# Patient Record
Sex: FEMALE | Race: ASIAN | Employment: FULL TIME | URBAN - METROPOLITAN AREA
[De-identification: names, ages, dates, MRNs, and addresses within clinical notes are randomized per-mention and may not be internally consistent; named-entity substitution may affect disease eponyms.]

---

## 2019-03-07 ENCOUNTER — APPOINTMENT (OUTPATIENT)
Dept: CT IMAGING | Facility: CLINIC | Age: 54
End: 2019-03-07
Attending: EMERGENCY MEDICINE
Payer: COMMERCIAL

## 2019-03-07 ENCOUNTER — HOSPITAL ENCOUNTER (EMERGENCY)
Facility: CLINIC | Age: 54
Discharge: HOME | End: 2019-03-07
Attending: EMERGENCY MEDICINE | Admitting: EMERGENCY MEDICINE
Payer: COMMERCIAL

## 2019-03-07 VITALS
TEMPERATURE: 98.8 F | RESPIRATION RATE: 18 BRPM | HEART RATE: 81 BPM | OXYGEN SATURATION: 99 % | DIASTOLIC BLOOD PRESSURE: 98 MMHG | SYSTOLIC BLOOD PRESSURE: 162 MMHG

## 2019-03-07 DIAGNOSIS — K57.92 ACUTE DIVERTICULITIS: ICD-10-CM

## 2019-03-07 LAB
ALBUMIN SERPL-MCNC: 3.5 G/DL (ref 3.4–5)
ALBUMIN UR-MCNC: NEGATIVE MG/DL
ALP SERPL-CCNC: 101 U/L (ref 40–150)
ALT SERPL W P-5'-P-CCNC: 47 U/L (ref 0–50)
ANION GAP SERPL CALCULATED.3IONS-SCNC: 6 MMOL/L (ref 3–14)
APPEARANCE UR: CLEAR
AST SERPL W P-5'-P-CCNC: 23 U/L (ref 0–45)
BASOPHILS # BLD AUTO: 0 10E9/L (ref 0–0.2)
BASOPHILS NFR BLD AUTO: 0.2 %
BILIRUB SERPL-MCNC: 0.5 MG/DL (ref 0.2–1.3)
BILIRUB UR QL STRIP: NEGATIVE
BUN SERPL-MCNC: 14 MG/DL (ref 7–30)
CALCIUM SERPL-MCNC: 8.7 MG/DL (ref 8.5–10.1)
CHLORIDE SERPL-SCNC: 104 MMOL/L (ref 94–109)
CO2 SERPL-SCNC: 26 MMOL/L (ref 20–32)
COLOR UR AUTO: COLORLESS
CREAT BLD-MCNC: 0.7 MG/DL (ref 0.52–1.04)
CREAT SERPL-MCNC: 0.66 MG/DL (ref 0.52–1.04)
DIFFERENTIAL METHOD BLD: ABNORMAL
EOSINOPHIL # BLD AUTO: 0.1 10E9/L (ref 0–0.7)
EOSINOPHIL NFR BLD AUTO: 0.7 %
ERYTHROCYTE [DISTWIDTH] IN BLOOD BY AUTOMATED COUNT: 13.7 % (ref 10–15)
GFR SERPL CREATININE-BSD FRML MDRD: 88 ML/MIN/{1.73_M2}
GFR SERPL CREATININE-BSD FRML MDRD: >90 ML/MIN/{1.73_M2}
GLUCOSE SERPL-MCNC: 127 MG/DL (ref 70–99)
GLUCOSE UR STRIP-MCNC: NEGATIVE MG/DL
HCT VFR BLD AUTO: 39.5 % (ref 35–47)
HGB BLD-MCNC: 12.7 G/DL (ref 11.7–15.7)
HGB UR QL STRIP: NEGATIVE
IMM GRANULOCYTES # BLD: 0.1 10E9/L (ref 0–0.4)
IMM GRANULOCYTES NFR BLD: 0.7 %
KETONES UR STRIP-MCNC: NEGATIVE MG/DL
LEUKOCYTE ESTERASE UR QL STRIP: NEGATIVE
LIPASE SERPL-CCNC: 240 U/L (ref 73–393)
LYMPHOCYTES # BLD AUTO: 2.4 10E9/L (ref 0.8–5.3)
LYMPHOCYTES NFR BLD AUTO: 13.1 %
MCH RBC QN AUTO: 26.5 PG (ref 26.5–33)
MCHC RBC AUTO-ENTMCNC: 32.2 G/DL (ref 31.5–36.5)
MCV RBC AUTO: 83 FL (ref 78–100)
MONOCYTES # BLD AUTO: 1.1 10E9/L (ref 0–1.3)
MONOCYTES NFR BLD AUTO: 6.2 %
MUCOUS THREADS #/AREA URNS LPF: PRESENT /LPF
NEUTROPHILS # BLD AUTO: 14.6 10E9/L (ref 1.6–8.3)
NEUTROPHILS NFR BLD AUTO: 79.1 %
NITRATE UR QL: NEGATIVE
NRBC # BLD AUTO: 0 10*3/UL
NRBC BLD AUTO-RTO: 0 /100
PH UR STRIP: 7 PH (ref 5–7)
PLATELET # BLD AUTO: 414 10E9/L (ref 150–450)
POTASSIUM SERPL-SCNC: 3.7 MMOL/L (ref 3.4–5.3)
PROT SERPL-MCNC: 7.8 G/DL (ref 6.8–8.8)
RBC # BLD AUTO: 4.79 10E12/L (ref 3.8–5.2)
RBC #/AREA URNS AUTO: <1 /HPF (ref 0–2)
SODIUM SERPL-SCNC: 136 MMOL/L (ref 133–144)
SOURCE: ABNORMAL
SP GR UR STRIP: 1.01 (ref 1–1.03)
UROBILINOGEN UR STRIP-MCNC: 0 MG/DL (ref 0–2)
WBC # BLD AUTO: 18.5 10E9/L (ref 4–11)
WBC #/AREA URNS AUTO: <1 /HPF (ref 0–5)

## 2019-03-07 PROCEDURE — 85025 COMPLETE CBC W/AUTO DIFF WBC: CPT | Performed by: EMERGENCY MEDICINE

## 2019-03-07 PROCEDURE — 83690 ASSAY OF LIPASE: CPT | Performed by: EMERGENCY MEDICINE

## 2019-03-07 PROCEDURE — 81001 URINALYSIS AUTO W/SCOPE: CPT | Performed by: EMERGENCY MEDICINE

## 2019-03-07 PROCEDURE — 25000128 H RX IP 250 OP 636: Performed by: EMERGENCY MEDICINE

## 2019-03-07 PROCEDURE — 82565 ASSAY OF CREATININE: CPT | Mod: 91

## 2019-03-07 PROCEDURE — 74177 CT ABD & PELVIS W/CONTRAST: CPT

## 2019-03-07 PROCEDURE — 99285 EMERGENCY DEPT VISIT HI MDM: CPT | Mod: 25

## 2019-03-07 PROCEDURE — 96374 THER/PROPH/DIAG INJ IV PUSH: CPT | Mod: 59

## 2019-03-07 PROCEDURE — 80053 COMPREHEN METABOLIC PANEL: CPT | Performed by: EMERGENCY MEDICINE

## 2019-03-07 PROCEDURE — 96375 TX/PRO/DX INJ NEW DRUG ADDON: CPT

## 2019-03-07 PROCEDURE — 96361 HYDRATE IV INFUSION ADD-ON: CPT

## 2019-03-07 RX ORDER — ONDANSETRON 2 MG/ML
4 INJECTION INTRAMUSCULAR; INTRAVENOUS
Status: COMPLETED | OUTPATIENT
Start: 2019-03-07 | End: 2019-03-07

## 2019-03-07 RX ORDER — IOPAMIDOL 755 MG/ML
500 INJECTION, SOLUTION INTRAVASCULAR ONCE
Status: COMPLETED | OUTPATIENT
Start: 2019-03-07 | End: 2019-03-07

## 2019-03-07 RX ORDER — L. ACIDOPHILUS/L.BULGARICUS 1MM CELL
1 TABLET ORAL 2 TIMES DAILY
Qty: 20 TABLET | Refills: 0 | Status: SHIPPED | OUTPATIENT
Start: 2019-03-07 | End: 2019-03-17

## 2019-03-07 RX ORDER — DOCUSATE SODIUM 100 MG/1
100 CAPSULE, LIQUID FILLED ORAL DAILY
Qty: 30 CAPSULE | Refills: 0 | Status: SHIPPED | OUTPATIENT
Start: 2019-03-07 | End: 2019-04-06

## 2019-03-07 RX ORDER — MORPHINE SULFATE 4 MG/ML
4 INJECTION, SOLUTION INTRAMUSCULAR; INTRAVENOUS
Status: DISCONTINUED | OUTPATIENT
Start: 2019-03-07 | End: 2019-03-07 | Stop reason: HOSPADM

## 2019-03-07 RX ORDER — HYDROCODONE BITARTRATE AND ACETAMINOPHEN 5; 325 MG/1; MG/1
1 TABLET ORAL EVERY 4 HOURS PRN
Qty: 12 TABLET | Refills: 0 | Status: SHIPPED | OUTPATIENT
Start: 2019-03-07 | End: 2019-03-10

## 2019-03-07 RX ADMIN — MORPHINE SULFATE 4 MG: 4 INJECTION INTRAVENOUS at 01:23

## 2019-03-07 RX ADMIN — ONDANSETRON 4 MG: 2 INJECTION INTRAMUSCULAR; INTRAVENOUS at 01:23

## 2019-03-07 RX ADMIN — IOPAMIDOL 85 ML: 755 INJECTION, SOLUTION INTRAVENOUS at 01:48

## 2019-03-07 RX ADMIN — SODIUM CHLORIDE 61 ML: 9 INJECTION, SOLUTION INTRAVENOUS at 01:49

## 2019-03-07 RX ADMIN — SODIUM CHLORIDE 1000 ML: 9 INJECTION, SOLUTION INTRAVENOUS at 01:23

## 2019-03-07 ASSESSMENT — ENCOUNTER SYMPTOMS
VOMITING: 0
ABDOMINAL PAIN: 1
NAUSEA: 0

## 2019-03-07 NOTE — LETTER
March 7, 2019      To Whom It May Concern:      Aamir West was seen in our Emergency Department today, 03/07/19.  I expect her condition to improve over the next 2-3 days.  She may return to work/school when improved.    Sincerely,        Jessica Judd RN

## 2019-03-07 NOTE — DISCHARGE INSTRUCTIONS
Discharge Instructions  Diverticulitis  Your provider has diagnosed you with diverticulitis.  Diverticulitis is an infection of a diverticulum, which is a tiny sack-like structure that protrudes off the wall of the colon (large intestine).  These sacks are created over years of increased pressure in the colon (this is diverticulosis), usually as a result of a diet without enough fruits, vegetables, and whole grains. Because these sacks are small, bacteria can get trapped inside them and cause an infection (this is divericulitis).  This infection often causes abdominal (belly) pain, fever, nausea (sick to stomach), and vomiting (throwing up). Diverticulitis is usually treated at home.  However, sometimes diverticulitis needs treatment in the hospital and may even need surgery.    Generally, every Emergency Department visit should have a follow-up clinic visit with either a primary or a specialty clinic/provider. Please follow-up as instructed by your emergency provider today.    Return to the Emergency Department if:   You get an oral temperature above 102oF or as directed by your provider.  You have blood in your stools (bright red or black, tarry stools), or have blood in your vomit.  You keep vomiting or cannot drink liquids or cannot keep your medicine down.  You cannot have a bowel movement or you cannot pass gas.  Your stomach gets bloated or bigger.  You faint or become very weak.  Your pain is too bad to tolerate.  You have new symptoms or anything that worries you.    What can I do to help myself?  Fill any antibiotic prescriptions the provider gave you and take them right away. Be sure to finish the whole antibiotic prescription.  For the first day or two at home drink only clear liquids.  This lets your intestines rest.  If your pain has improved after one or two days, you may start eating mild foods. Soda crackers, toast, plain noodles, gelatin, applesauce and bananas are good first choices.  Avoid foods  "that have acid, are spicy, fatty or fibrous (such as meats, coarse grains, vegetables). You may start eating these foods again in about 3-4 days when you are better.  Once you are back to normal, eat a high fiber diet of fruits, vegetables and whole grains. Some people think you should avoid eating nuts, seeds, and corn, but there is no definite proof this makes any difference in whether you will get diverticulitis again.   Pain and fever can be treated with Tylenol  (acetaminophen) or you might have been prescribed a pain medication.    Probiotics: If you have been given an antibiotic, you may want to also take a probiotic pill or eat yogurt with live cultures. Probiotics have \"good bacteria\" to help your intestines stay healthy. Studies have shown that probiotics help prevent diarrhea and other intestine problems (including C. diff infection) when you take antibiotics. You can buy these without a prescription in the pharmacy section of the store.  If you were given a prescription for medicine here today, be sure to read all of the information (including the package insert) that comes with your prescription.  This will include important information about the medicine, its side effects, and any warnings that you need to know about.  The pharmacist who fills the prescription can provide more information and answer questions you may have about the medicine.  If you have questions or concerns that the pharmacist cannot address, please call or return to the Emergency Department.     Remember that you can always come back to the Emergency Department if you are not able to see your regular provider in the amount of time listed above, if you get any new symptoms, or if there is anything that worries you.   "

## 2019-03-07 NOTE — ED AVS SNAPSHOT
[facility] Emergency Department  [address]  [facility] [address]  Phone:  [phone]  Fax:  [phone]                                    Aamir West   MRN: [id number]    Department:  [facility] Emergency Department   Date of Visit:  3/7/2019           After Visit Summary Signature Page    I have received my discharge instructions, and my questions have been answered. I have discussed any challenges I see with this plan with the nurse or doctor.    ..........................................................................................................................................  Patient/Patient Representative Signature      ..........................................................................................................................................  Patient Representative Print Name and Relationship to Patient    ..................................................               ................................................  Date                                   Time    ..........................................................................................................................................  Reviewed by Signature/Title    ...................................................              ..............................................  Date                                               Time          22EPIC Rev 08/18

## 2019-03-07 NOTE — ED PROVIDER NOTES
History     Chief Complaint:  Abdominal Pain    The history is provided by the patient.      Aamir West is a 54 year old female who presents to the emergency department with her  for evaluation of abdominal pain. Since  the patient noted the onset of right lower abdominal pain after getting home from work and getting ready for bed. She states that she tried to rest it off, but the pain got worse despite an Advil at home prompting her to seek further evaluation here in the ED. Here, the patient complains of the right lower abdominal pain worse with palpation and movement. She denies any co-occurrence of nausea, vomiting or urinary symptoms.     Allergies:  NKDA    Medications:    The patient is not currently taking any prescribed medications.    Past Medical History:    The patient denies any significant past medical history.    Past Surgical History:     Section    Family History:    No past pertinent family history.    Social History:  Marital Status:    Tobacco Use: None    Review of Systems   Gastrointestinal: Positive for abdominal pain. Negative for nausea and vomiting.   Genitourinary: Negative.    All other systems reviewed and are negative.        Physical Exam     Patient Vitals for the past 24 hrs:   BP Temp Temp src Pulse Resp SpO2   19 0210 -- -- -- -- -- 99 %   19 0051 (!) 162/98 98.8  F (37.1  C) Temporal 81 18 97 %       Physical Exam  Nursing note and vitals reviewed.  Constitutional: Cooperative. Uncomfortable appearing.   HENT:   Mouth/Throat: Mucous membranes are normal.   Cardiovascular: Normal rate, regular rhythm and normal heart sounds.  No murmur.  Pulmonary/Chest: Effort normal and breath sounds normal. No respiratory distress.  Abdominal: Soft. Normal appearance and bowel sounds are normal. No distension. Diffuse tenderness on right side. There is no rigidity and no guarding.   Neurological: Alert.   Skin: Skin is warm and dry.   Psychiatric: Normal  mood and affect.     Emergency Department Course     Imaging:  CT Abdomen/Pelvis with IV contrast:   1. Haziness is present within the fat about a 1 cm soft tissue attenuation protrusion from the anterior aspect of the cecum. This very likely represents diverticulitis. A small colonic mass cannot be entirely excluded. Recommend correlation with the patient's symptoms. A follow-up CT scan could be considered in one month for reevaluation.  2. No other cause of acute pain identified in the abdomen or pelvis. The appendix is unremarkable.   As per radiology.    Laboratory:  CBC: WBC: 18.5 (H), HGB: 12.7, PLT: 414  0115 CMP: Glucose 127 (H), o/w WNL (Creatinine: 0.66)  Lipase: 240    UA with micro: Mucous: Present, o/w negative    Interventions:  0123 NS 1L IV  0123 Morphine 4 mg, IV  0123 Zofran 4 mg, IV    Emergency Department Course:  2302 Nursing notes and vitals reviewed. I performed an exam of the patient as documented above.     IV inserted. Medicine administered as documented above. Blood drawn. This was sent to the lab for further testing, results above.    The patient provided a urine sample here in the emergency department. This was sent for laboratory testing, findings above.     The patient was sent for a CT of the abdomen and pelvis while in the emergency department, findings above.     0232 I rechecked the patient and discussed the results of her workup thus far.     Findings and plan explained to the patient and spouse. Patient discharged home with instructions regarding supportive care, medications, and reasons to return. The importance of close follow-up was reviewed.    I personally reviewed the laboratory results with the patient and spouse and answered all related questions prior to discharge.    Impression & Plan      Medical Decision Making:  Aamir West is a 54 year old female who presents with right sided abdominal pain. I was concerned for possible bowel obstruction, appendicitis or perforation.  CT scan shows findings consistent with pericecal diverticulitis with a normal appearing appendix. No evidence of perforation or abscess. The radiologist did comment on the findings that cannot rule out a mass lesion. This seems unlikely to be causing her pain in this clinical scenario and we will treat with antibiotics, pain medication and stool softeners per protocol with diverticulitis. I did encourage follow up with a colonoscopy as she has never had one. She and her  expressed understanding with this. Return precautions discussed and she will be discharged home.     Diagnosis:    ICD-10-CM    1. Acute diverticulitis K57.92        Disposition:  discharged to home    Discharge Medications:     Medication List      Started    amoxicillin-clavulanate 875-125 MG tablet  Commonly known as:  AUGMENTIN  1 tablet, Oral, 2 TIMES DAILY     docusate sodium 100 MG capsule  Commonly known as:  COLACE  100 mg, Oral, DAILY     HYDROcodone-acetaminophen 5-325 MG tablet  Commonly known as:  NORCO  1 tablet, Oral, EVERY 4 HOURS PRN     lactobacillus Tabs  1 tablet, Oral, 2 TIMES DAILY          Scribe Disclosure:  I, Gaurang Zamarripa, am serving as a scribe on 3/7/2019 at 12:59 AM to personally document services performed by Nate Morillo MD based on my observations and the provider's statements to me.     Gaurang Zamarripa  3/7/2019   [facility] EMERGENCY DEPARTMENT       Nate Morillo MD  03/07/19 0315

## 2022-03-08 ENCOUNTER — APPOINTMENT (OUTPATIENT)
Dept: CT IMAGING | Facility: CLINIC | Age: 57
DRG: 308 | End: 2022-03-08
Attending: EMERGENCY MEDICINE

## 2022-03-08 ENCOUNTER — APPOINTMENT (OUTPATIENT)
Dept: GENERAL RADIOLOGY | Facility: CLINIC | Age: 57
DRG: 308 | End: 2022-03-08
Attending: EMERGENCY MEDICINE

## 2022-03-08 ENCOUNTER — HOSPITAL ENCOUNTER (INPATIENT)
Facility: CLINIC | Age: 57
LOS: 2 days | Discharge: HOME | DRG: 308 | End: 2022-03-10
Attending: EMERGENCY MEDICINE | Admitting: INTERNAL MEDICINE

## 2022-03-08 ENCOUNTER — TRANSFERRED RECORDS (OUTPATIENT)
Dept: HEALTH INFORMATION MANAGEMENT | Facility: CLINIC | Age: 57
End: 2022-03-08

## 2022-03-08 DIAGNOSIS — I48.91 ATRIAL FIBRILLATION WITH RVR (H): ICD-10-CM

## 2022-03-08 DIAGNOSIS — I50.9 ACUTE CONGESTIVE HEART FAILURE, UNSPECIFIED HEART FAILURE TYPE (H): ICD-10-CM

## 2022-03-08 LAB
ALBUMIN SERPL-MCNC: 3.6 G/DL (ref 3.4–5)
ALP SERPL-CCNC: 105 U/L (ref 40–150)
ALT SERPL W P-5'-P-CCNC: 67 U/L (ref 0–50)
ANION GAP SERPL CALCULATED.3IONS-SCNC: 7 MMOL/L (ref 3–14)
AST SERPL W P-5'-P-CCNC: 59 U/L (ref 0–45)
BASOPHILS # BLD AUTO: 0 10E3/UL (ref 0–0.2)
BASOPHILS NFR BLD AUTO: 0 %
BILIRUB SERPL-MCNC: 0.7 MG/DL (ref 0.2–1.3)
BUN SERPL-MCNC: 15 MG/DL (ref 7–30)
CALCIUM SERPL-MCNC: 8.8 MG/DL (ref 8.5–10.1)
CHLORIDE BLD-SCNC: 109 MMOL/L (ref 94–109)
CO2 SERPL-SCNC: 25 MMOL/L (ref 20–32)
CREAT SERPL-MCNC: 0.78 MG/DL (ref 0.52–1.04)
EOSINOPHIL # BLD AUTO: 0.2 10E3/UL (ref 0–0.7)
EOSINOPHIL NFR BLD AUTO: 3 %
ERYTHROCYTE [DISTWIDTH] IN BLOOD BY AUTOMATED COUNT: 14 % (ref 10–15)
FLUAV RNA SPEC QL NAA+PROBE: NEGATIVE
FLUBV RNA RESP QL NAA+PROBE: NEGATIVE
GFR SERPL CREATININE-BSD FRML MDRD: 89 ML/MIN/1.73M2
GLUCOSE BLD-MCNC: 113 MG/DL (ref 70–99)
HCT VFR BLD AUTO: 40.5 % (ref 35–47)
HGB BLD-MCNC: 12.7 G/DL (ref 11.7–15.7)
HOLD SPECIMEN: NORMAL
IMM GRANULOCYTES # BLD: 0 10E3/UL
IMM GRANULOCYTES NFR BLD: 0 %
INR PPP: 1.1 (ref 0.85–1.15)
LYMPHOCYTES # BLD AUTO: 2 10E3/UL (ref 0.8–5.3)
LYMPHOCYTES NFR BLD AUTO: 26 %
MAGNESIUM SERPL-MCNC: 2 MG/DL (ref 1.6–2.3)
MCH RBC QN AUTO: 27.4 PG (ref 26.5–33)
MCHC RBC AUTO-ENTMCNC: 31.4 G/DL (ref 31.5–36.5)
MCV RBC AUTO: 87 FL (ref 78–100)
MONOCYTES # BLD AUTO: 0.5 10E3/UL (ref 0–1.3)
MONOCYTES NFR BLD AUTO: 7 %
NEUTROPHILS # BLD AUTO: 4.9 10E3/UL (ref 1.6–8.3)
NEUTROPHILS NFR BLD AUTO: 64 %
NRBC # BLD AUTO: 0 10E3/UL
NRBC BLD AUTO-RTO: 0 /100
NT-PROBNP SERPL-MCNC: 1695 PG/ML (ref 0–900)
PLATELET # BLD AUTO: 283 10E3/UL (ref 150–450)
POTASSIUM BLD-SCNC: 3.7 MMOL/L (ref 3.4–5.3)
PROT SERPL-MCNC: 8 G/DL (ref 6.8–8.8)
RBC # BLD AUTO: 4.64 10E6/UL (ref 3.8–5.2)
SARS-COV-2 RNA RESP QL NAA+PROBE: NEGATIVE
SODIUM SERPL-SCNC: 141 MMOL/L (ref 133–144)
T4 FREE SERPL-MCNC: 0.96 NG/DL (ref 0.76–1.46)
TROPONIN I SERPL HS-MCNC: 19 NG/L
TSH SERPL DL<=0.005 MIU/L-ACNC: 7.81 MU/L (ref 0.4–4)
WBC # BLD AUTO: 7.7 10E3/UL (ref 4–11)

## 2022-03-08 PROCEDURE — 85025 COMPLETE CBC W/AUTO DIFF WBC: CPT | Performed by: EMERGENCY MEDICINE

## 2022-03-08 PROCEDURE — 87636 SARSCOV2 & INF A&B AMP PRB: CPT | Performed by: EMERGENCY MEDICINE

## 2022-03-08 PROCEDURE — 99291 CRITICAL CARE FIRST HOUR: CPT | Mod: 25

## 2022-03-08 PROCEDURE — 258N000003 HC RX IP 258 OP 636: Performed by: EMERGENCY MEDICINE

## 2022-03-08 PROCEDURE — 84132 ASSAY OF SERUM POTASSIUM: CPT | Performed by: INTERNAL MEDICINE

## 2022-03-08 PROCEDURE — 96375 TX/PRO/DX INJ NEW DRUG ADDON: CPT

## 2022-03-08 PROCEDURE — 71275 CT ANGIOGRAPHY CHEST: CPT

## 2022-03-08 PROCEDURE — 96365 THER/PROPH/DIAG IV INF INIT: CPT | Mod: 59

## 2022-03-08 PROCEDURE — 83735 ASSAY OF MAGNESIUM: CPT | Performed by: EMERGENCY MEDICINE

## 2022-03-08 PROCEDURE — 84484 ASSAY OF TROPONIN QUANT: CPT | Performed by: EMERGENCY MEDICINE

## 2022-03-08 PROCEDURE — 96376 TX/PRO/DX INJ SAME DRUG ADON: CPT

## 2022-03-08 PROCEDURE — 84443 ASSAY THYROID STIM HORMONE: CPT | Performed by: EMERGENCY MEDICINE

## 2022-03-08 PROCEDURE — 71045 X-RAY EXAM CHEST 1 VIEW: CPT

## 2022-03-08 PROCEDURE — 96366 THER/PROPH/DIAG IV INF ADDON: CPT

## 2022-03-08 PROCEDURE — 250N000011 HC RX IP 250 OP 636: Performed by: EMERGENCY MEDICINE

## 2022-03-08 PROCEDURE — 250N000009 HC RX 250: Performed by: EMERGENCY MEDICINE

## 2022-03-08 PROCEDURE — C9803 HOPD COVID-19 SPEC COLLECT: HCPCS

## 2022-03-08 PROCEDURE — 80053 COMPREHEN METABOLIC PANEL: CPT | Performed by: EMERGENCY MEDICINE

## 2022-03-08 PROCEDURE — 84439 ASSAY OF FREE THYROXINE: CPT | Performed by: EMERGENCY MEDICINE

## 2022-03-08 PROCEDURE — 93005 ELECTROCARDIOGRAM TRACING: CPT

## 2022-03-08 PROCEDURE — 120N000001 HC R&B MED SURG/OB

## 2022-03-08 PROCEDURE — 36415 COLL VENOUS BLD VENIPUNCTURE: CPT | Performed by: INTERNAL MEDICINE

## 2022-03-08 PROCEDURE — 83880 ASSAY OF NATRIURETIC PEPTIDE: CPT | Performed by: EMERGENCY MEDICINE

## 2022-03-08 PROCEDURE — 82040 ASSAY OF SERUM ALBUMIN: CPT | Performed by: EMERGENCY MEDICINE

## 2022-03-08 PROCEDURE — 99221 1ST HOSP IP/OBS SF/LOW 40: CPT | Mod: AI | Performed by: INTERNAL MEDICINE

## 2022-03-08 PROCEDURE — 85610 PROTHROMBIN TIME: CPT | Performed by: EMERGENCY MEDICINE

## 2022-03-08 PROCEDURE — 36415 COLL VENOUS BLD VENIPUNCTURE: CPT | Performed by: EMERGENCY MEDICINE

## 2022-03-08 PROCEDURE — 99207 PR CDG-HISTORY COMP: MEETS EXP. PROB FOCUSED- DOWN CODED LACK OF PFSH: CPT | Performed by: INTERNAL MEDICINE

## 2022-03-08 RX ORDER — NALOXONE HYDROCHLORIDE 0.4 MG/ML
0.4 INJECTION, SOLUTION INTRAMUSCULAR; INTRAVENOUS; SUBCUTANEOUS
Status: DISCONTINUED | OUTPATIENT
Start: 2022-03-08 | End: 2022-03-10 | Stop reason: HOSPADM

## 2022-03-08 RX ORDER — ONDANSETRON 4 MG/1
4 TABLET, ORALLY DISINTEGRATING ORAL EVERY 6 HOURS PRN
Status: DISCONTINUED | OUTPATIENT
Start: 2022-03-08 | End: 2022-03-10 | Stop reason: HOSPADM

## 2022-03-08 RX ORDER — ONDANSETRON 2 MG/ML
4 INJECTION INTRAMUSCULAR; INTRAVENOUS EVERY 6 HOURS PRN
Status: DISCONTINUED | OUTPATIENT
Start: 2022-03-08 | End: 2022-03-10 | Stop reason: HOSPADM

## 2022-03-08 RX ORDER — LIDOCAINE 40 MG/G
CREAM TOPICAL
Status: DISCONTINUED | OUTPATIENT
Start: 2022-03-08 | End: 2022-03-09

## 2022-03-08 RX ORDER — DILTIAZEM HYDROCHLORIDE 5 MG/ML
15 INJECTION INTRAVENOUS ONCE
Status: COMPLETED | OUTPATIENT
Start: 2022-03-08 | End: 2022-03-08

## 2022-03-08 RX ORDER — NALOXONE HYDROCHLORIDE 0.4 MG/ML
0.2 INJECTION, SOLUTION INTRAMUSCULAR; INTRAVENOUS; SUBCUTANEOUS
Status: DISCONTINUED | OUTPATIENT
Start: 2022-03-08 | End: 2022-03-10 | Stop reason: HOSPADM

## 2022-03-08 RX ORDER — MORPHINE SULFATE 4 MG/ML
4 INJECTION, SOLUTION INTRAMUSCULAR; INTRAVENOUS
Status: DISCONTINUED | OUTPATIENT
Start: 2022-03-08 | End: 2022-03-09

## 2022-03-08 RX ORDER — ASPIRIN 81 MG/1
81 TABLET ORAL DAILY
Status: DISCONTINUED | OUTPATIENT
Start: 2022-03-09 | End: 2022-03-09

## 2022-03-08 RX ORDER — DILTIAZEM HCL IN NACL,ISO-OSM 125 MG/125
5-15 PLASTIC BAG, INJECTION (ML) INTRAVENOUS CONTINUOUS
Status: DISCONTINUED | OUTPATIENT
Start: 2022-03-08 | End: 2022-03-08

## 2022-03-08 RX ORDER — IOPAMIDOL 755 MG/ML
500 INJECTION, SOLUTION INTRAVASCULAR ONCE
Status: COMPLETED | OUTPATIENT
Start: 2022-03-08 | End: 2022-03-08

## 2022-03-08 RX ORDER — FUROSEMIDE 10 MG/ML
40 INJECTION INTRAMUSCULAR; INTRAVENOUS ONCE
Status: COMPLETED | OUTPATIENT
Start: 2022-03-08 | End: 2022-03-08

## 2022-03-08 RX ORDER — IBUPROFEN 200 MG
200 TABLET ORAL EVERY 4 HOURS PRN
Status: ON HOLD | COMMUNITY
End: 2022-03-10

## 2022-03-08 RX ADMIN — DILTIAZEM HYDROCHLORIDE 5 MG/HR: 5 INJECTION, SOLUTION INTRAVENOUS at 19:05

## 2022-03-08 RX ADMIN — DILTIAZEM HYDROCHLORIDE 15 MG: 5 INJECTION, SOLUTION INTRAVENOUS at 18:45

## 2022-03-08 RX ADMIN — FUROSEMIDE 40 MG: 10 INJECTION, SOLUTION INTRAMUSCULAR; INTRAVENOUS at 20:43

## 2022-03-08 RX ADMIN — SODIUM CHLORIDE 1000 ML: 9 INJECTION, SOLUTION INTRAVENOUS at 18:45

## 2022-03-08 RX ADMIN — MORPHINE SULFATE 4 MG: 4 INJECTION INTRAVENOUS at 19:00

## 2022-03-08 RX ADMIN — SODIUM CHLORIDE 75 ML: 9 INJECTION, SOLUTION INTRAVENOUS at 20:03

## 2022-03-08 RX ADMIN — IOPAMIDOL 54 ML: 755 INJECTION, SOLUTION INTRAVENOUS at 20:03

## 2022-03-08 ASSESSMENT — ACTIVITIES OF DAILY LIVING (ADL)
ADLS_ACUITY_SCORE: 7

## 2022-03-08 ASSESSMENT — ENCOUNTER SYMPTOMS
COUGH: 0
FEVER: 0
SHORTNESS OF BREATH: 1

## 2022-03-08 NOTE — LETTER
March 8, 2022      To Whom It May Concern:      Aamir West was seen in our Emergency Department today, 03/08/22. Please excuse her  from work for 3/8/2022 and 3/9/2022 as he was caring for her.   Sincerely,        Jina Carbajal RN

## 2022-03-08 NOTE — LETTER
March 8, 2022      To Whom It May Concern:      Aamir West was seen in our Emergency Department today, 03/08/22. Please excuse her from work for the next 3 days.    Sincerely,        Jina Carbajal RN

## 2022-03-08 NOTE — LETTER
[place] [address] [facility]  [address]  [facility] [address]  [phone]    March 10, 2022    Re: Aamir West   [place] [address]  [phone] (home) [phone] (work)    : 1965      To Whom It May Concern:      Aamir West was hospitalized from 3/8/2022 until 3/10/2022 due to medical illness.  She may return to work when cleared at outpatient follow up with restrictions to be determined by outpatient physician at follow up.        Sincerely,        Juventino Poole MD   Hospitalist Service.

## 2022-03-09 ENCOUNTER — APPOINTMENT (OUTPATIENT)
Dept: CARDIOLOGY | Facility: CLINIC | Age: 57
DRG: 308 | End: 2022-03-09
Attending: INTERNAL MEDICINE

## 2022-03-09 LAB
ANION GAP SERPL CALCULATED.3IONS-SCNC: 5 MMOL/L (ref 3–14)
ATRIAL RATE - MUSE: 144 BPM
BASOPHILS # BLD AUTO: 0 10E3/UL (ref 0–0.2)
BASOPHILS NFR BLD AUTO: 0 %
BI-PLANE LVEF ECHO: NORMAL
BUN SERPL-MCNC: 11 MG/DL (ref 7–30)
CALCIUM SERPL-MCNC: 8.6 MG/DL (ref 8.5–10.1)
CHLORIDE BLD-SCNC: 108 MMOL/L (ref 94–109)
CO2 SERPL-SCNC: 27 MMOL/L (ref 20–32)
CREAT SERPL-MCNC: 0.64 MG/DL (ref 0.52–1.04)
DIASTOLIC BLOOD PRESSURE - MUSE: NORMAL MMHG
EOSINOPHIL # BLD AUTO: 0.3 10E3/UL (ref 0–0.7)
EOSINOPHIL NFR BLD AUTO: 3 %
ERYTHROCYTE [DISTWIDTH] IN BLOOD BY AUTOMATED COUNT: 13.7 % (ref 10–15)
ERYTHROCYTE [DISTWIDTH] IN BLOOD BY AUTOMATED COUNT: 14.2 % (ref 10–15)
GFR SERPL CREATININE-BSD FRML MDRD: >90 ML/MIN/1.73M2
GLUCOSE BLD-MCNC: 110 MG/DL (ref 70–99)
GLUCOSE BLDC GLUCOMTR-MCNC: 115 MG/DL (ref 70–99)
HCT VFR BLD AUTO: 34.9 % (ref 35–47)
HCT VFR BLD AUTO: 36.5 % (ref 35–47)
HGB BLD-MCNC: 11.2 G/DL (ref 11.7–15.7)
HGB BLD-MCNC: 11.9 G/DL (ref 11.7–15.7)
HOLD SPECIMEN: NORMAL
IMM GRANULOCYTES # BLD: 0 10E3/UL
IMM GRANULOCYTES NFR BLD: 0 %
INTERPRETATION ECG - MUSE: NORMAL
LYMPHOCYTES # BLD AUTO: 2.1 10E3/UL (ref 0.8–5.3)
LYMPHOCYTES NFR BLD AUTO: 26 %
MCH RBC QN AUTO: 27.4 PG (ref 26.5–33)
MCH RBC QN AUTO: 27.5 PG (ref 26.5–33)
MCHC RBC AUTO-ENTMCNC: 32.1 G/DL (ref 31.5–36.5)
MCHC RBC AUTO-ENTMCNC: 32.6 G/DL (ref 31.5–36.5)
MCV RBC AUTO: 84 FL (ref 78–100)
MCV RBC AUTO: 86 FL (ref 78–100)
MONOCYTES # BLD AUTO: 0.5 10E3/UL (ref 0–1.3)
MONOCYTES NFR BLD AUTO: 7 %
NEUTROPHILS # BLD AUTO: 5.2 10E3/UL (ref 1.6–8.3)
NEUTROPHILS NFR BLD AUTO: 64 %
NRBC # BLD AUTO: 0 10E3/UL
NRBC BLD AUTO-RTO: 0 /100
P AXIS - MUSE: NORMAL DEGREES
PLATELET # BLD AUTO: 250 10E3/UL (ref 150–450)
PLATELET # BLD AUTO: 270 10E3/UL (ref 150–450)
POTASSIUM BLD-SCNC: 3.1 MMOL/L (ref 3.4–5.3)
POTASSIUM BLD-SCNC: 3.5 MMOL/L (ref 3.4–5.3)
PR INTERVAL - MUSE: NORMAL MS
QRS DURATION - MUSE: 74 MS
QT - MUSE: 326 MS
QTC - MUSE: 516 MS
R AXIS - MUSE: 50 DEGREES
RBC # BLD AUTO: 4.08 10E6/UL (ref 3.8–5.2)
RBC # BLD AUTO: 4.34 10E6/UL (ref 3.8–5.2)
SODIUM SERPL-SCNC: 140 MMOL/L (ref 133–144)
SYSTOLIC BLOOD PRESSURE - MUSE: NORMAL MMHG
T AXIS - MUSE: 114 DEGREES
UFH PPP CHRO-ACNC: 0.13 IU/ML
UFH PPP CHRO-ACNC: 0.14 IU/ML
UFH PPP CHRO-ACNC: 0.2 IU/ML
VENTRICULAR RATE- MUSE: 151 BPM
WBC # BLD AUTO: 7.5 10E3/UL (ref 4–11)
WBC # BLD AUTO: 8.1 10E3/UL (ref 4–11)

## 2022-03-09 PROCEDURE — 250N000013 HC RX MED GY IP 250 OP 250 PS 637: Performed by: INTERNAL MEDICINE

## 2022-03-09 PROCEDURE — 258N000003 HC RX IP 258 OP 636: Performed by: EMERGENCY MEDICINE

## 2022-03-09 PROCEDURE — 250N000009 HC RX 250: Performed by: EMERGENCY MEDICINE

## 2022-03-09 PROCEDURE — 93306 TTE W/DOPPLER COMPLETE: CPT | Mod: 26 | Performed by: INTERNAL MEDICINE

## 2022-03-09 PROCEDURE — 99207 PR CDG-MDM COMPONENT: MEETS LOW - DOWN CODED: CPT | Performed by: HOSPITALIST

## 2022-03-09 PROCEDURE — 85025 COMPLETE CBC W/AUTO DIFF WBC: CPT | Performed by: INTERNAL MEDICINE

## 2022-03-09 PROCEDURE — 85520 HEPARIN ASSAY: CPT | Performed by: HOSPITALIST

## 2022-03-09 PROCEDURE — 250N000011 HC RX IP 250 OP 636: Performed by: HOSPITALIST

## 2022-03-09 PROCEDURE — 120N000001 HC R&B MED SURG/OB

## 2022-03-09 PROCEDURE — 80048 BASIC METABOLIC PNL TOTAL CA: CPT | Performed by: INTERNAL MEDICINE

## 2022-03-09 PROCEDURE — 99222 1ST HOSP IP/OBS MODERATE 55: CPT | Mod: 25 | Performed by: INTERNAL MEDICINE

## 2022-03-09 PROCEDURE — 250N000011 HC RX IP 250 OP 636: Performed by: INTERNAL MEDICINE

## 2022-03-09 PROCEDURE — 250N000013 HC RX MED GY IP 250 OP 250 PS 637: Performed by: HOSPITALIST

## 2022-03-09 PROCEDURE — 85520 HEPARIN ASSAY: CPT | Performed by: INTERNAL MEDICINE

## 2022-03-09 PROCEDURE — 999N000208 ECHOCARDIOGRAM COMPLETE

## 2022-03-09 PROCEDURE — 255N000002 HC RX 255 OP 636: Performed by: INTERNAL MEDICINE

## 2022-03-09 PROCEDURE — 99232 SBSQ HOSP IP/OBS MODERATE 35: CPT | Performed by: HOSPITALIST

## 2022-03-09 PROCEDURE — 36415 COLL VENOUS BLD VENIPUNCTURE: CPT | Performed by: INTERNAL MEDICINE

## 2022-03-09 PROCEDURE — 36415 COLL VENOUS BLD VENIPUNCTURE: CPT | Performed by: HOSPITALIST

## 2022-03-09 PROCEDURE — 85027 COMPLETE CBC AUTOMATED: CPT | Performed by: HOSPITALIST

## 2022-03-09 RX ORDER — DILTIAZEM HYDROCHLORIDE 90 MG/1
90 CAPSULE, EXTENDED RELEASE ORAL 2 TIMES DAILY
Status: DISCONTINUED | OUTPATIENT
Start: 2022-03-09 | End: 2022-03-10 | Stop reason: HOSPADM

## 2022-03-09 RX ORDER — HEPARIN SODIUM 10000 [USP'U]/100ML
0-5000 INJECTION, SOLUTION INTRAVENOUS CONTINUOUS
Status: DISCONTINUED | OUTPATIENT
Start: 2022-03-09 | End: 2022-03-09

## 2022-03-09 RX ORDER — FUROSEMIDE 10 MG/ML
20 INJECTION INTRAMUSCULAR; INTRAVENOUS ONCE
Status: COMPLETED | OUTPATIENT
Start: 2022-03-09 | End: 2022-03-09

## 2022-03-09 RX ORDER — POTASSIUM CHLORIDE 1.5 G/1.58G
20 POWDER, FOR SOLUTION ORAL ONCE
Status: COMPLETED | OUTPATIENT
Start: 2022-03-09 | End: 2022-03-09

## 2022-03-09 RX ORDER — POTASSIUM CHLORIDE 1500 MG/1
40 TABLET, EXTENDED RELEASE ORAL ONCE
Status: COMPLETED | OUTPATIENT
Start: 2022-03-09 | End: 2022-03-09

## 2022-03-09 RX ORDER — LIDOCAINE 40 MG/G
CREAM TOPICAL
Status: DISCONTINUED | OUTPATIENT
Start: 2022-03-09 | End: 2022-03-10 | Stop reason: HOSPADM

## 2022-03-09 RX ORDER — HEPARIN SODIUM 10000 [USP'U]/100ML
0-5000 INJECTION, SOLUTION INTRAVENOUS CONTINUOUS
Status: DISCONTINUED | OUTPATIENT
Start: 2022-03-09 | End: 2022-03-10 | Stop reason: HOSPADM

## 2022-03-09 RX ORDER — LISINOPRIL 2.5 MG/1
2.5 TABLET ORAL DAILY
Status: DISCONTINUED | OUTPATIENT
Start: 2022-03-09 | End: 2022-03-09

## 2022-03-09 RX ORDER — NITROGLYCERIN 0.4 MG/1
0.4 TABLET SUBLINGUAL EVERY 5 MIN PRN
Status: DISCONTINUED | OUTPATIENT
Start: 2022-03-09 | End: 2022-03-10 | Stop reason: HOSPADM

## 2022-03-09 RX ADMIN — POTASSIUM CHLORIDE 40 MEQ: 1500 TABLET, EXTENDED RELEASE ORAL at 13:10

## 2022-03-09 RX ADMIN — POTASSIUM CHLORIDE 20 MEQ: 1.5 POWDER, FOR SOLUTION ORAL at 02:23

## 2022-03-09 RX ADMIN — HUMAN ALBUMIN MICROSPHERES AND PERFLUTREN 2 ML: 10; .22 INJECTION, SOLUTION INTRAVENOUS at 08:58

## 2022-03-09 RX ADMIN — HEPARIN SODIUM AND DEXTROSE 700 UNITS/HR: 10000; 5 INJECTION INTRAVENOUS at 09:55

## 2022-03-09 RX ADMIN — DILTIAZEM HYDROCHLORIDE 90 MG: 90 CAPSULE, EXTENDED RELEASE ORAL at 20:13

## 2022-03-09 RX ADMIN — DILTIAZEM HYDROCHLORIDE 90 MG: 90 CAPSULE, EXTENDED RELEASE ORAL at 13:03

## 2022-03-09 RX ADMIN — HEPARIN SODIUM AND DEXTROSE 850 UNITS/HR: 10000; 5 INJECTION INTRAVENOUS at 16:13

## 2022-03-09 RX ADMIN — LISINOPRIL 2.5 MG: 2.5 TABLET ORAL at 12:41

## 2022-03-09 RX ADMIN — FUROSEMIDE 20 MG: 10 INJECTION, SOLUTION INTRAMUSCULAR; INTRAVENOUS at 13:03

## 2022-03-09 RX ADMIN — DILTIAZEM HYDROCHLORIDE 5 MG/HR: 5 INJECTION, SOLUTION INTRAVENOUS at 07:33

## 2022-03-09 RX ADMIN — ASPIRIN 81 MG: 81 TABLET, COATED ORAL at 10:13

## 2022-03-09 ASSESSMENT — ACTIVITIES OF DAILY LIVING (ADL)
CONCENTRATING,_REMEMBERING_OR_MAKING_DECISIONS_DIFFICULTY: NO
FALL_HISTORY_WITHIN_LAST_SIX_MONTHS: NO
ADLS_ACUITY_SCORE: 5
DRESSING/BATHING_DIFFICULTY: NO
ADLS_ACUITY_SCORE: 5
WEAR_GLASSES_OR_BLIND: YES
ADLS_ACUITY_SCORE: 5
TOILETING_ISSUES: NO
WALKING_OR_CLIMBING_STAIRS_DIFFICULTY: NO
ADLS_ACUITY_SCORE: 5
VISION_MANAGEMENT: GLASSES
ADLS_ACUITY_SCORE: 5
EATING/SWALLOWING: OTHER (SEE COMMENTS)
DOING_ERRANDS_INDEPENDENTLY_DIFFICULTY: NO
ADLS_ACUITY_SCORE: 7
ADLS_ACUITY_SCORE: 5
HEARING_DIFFICULTY_OR_DEAF: NO
ADLS_ACUITY_SCORE: 5
DIFFICULTY_EATING/SWALLOWING: NO
ADLS_ACUITY_SCORE: 5
ADLS_ACUITY_SCORE: 5

## 2022-03-09 NOTE — ED PROVIDER NOTES
History   Chief Complaint:  Shortness of Breath       The history is provided by the patient. A  was used ([employer]).      Aamir West is a 57 year old female with history of asthma who presents with ~1 week of shortness of breath. The patient presented to urgent care in [facility] for evaluation. EKG performed while in clinic showed her to be in atrial fibrillation with RVR and she was sent to the ED for further workup. Patient denies any prior history of atrial fibrillation though does have hypertension, though denies taking any medication for this. She has not been taking any medications for her asthma for the last several years. Here, she endorses some centralized chest pain for which she took Advil this morning, this initially began ~1 week ago at the same time she developed shortness of breath. No leg swelling. No known ill contacts and patient denies fever or cough.    Review of Systems   Constitutional: Negative for fever.   Respiratory: Positive for shortness of breath. Negative for cough.    Cardiovascular: Positive for chest pain. Negative for leg swelling.   All other systems reviewed and are negative.    Allergies:  No Known Drug Allergies    Medications:  The patient denies taking any regular medications    Past Medical History:     Diverticulitis  Hypertension  Asthma    Social History:  The patient was accompanied to the ED by her .  Marital status:     Physical Exam     Patient Vitals for the past 24 hrs:   BP Temp Temp src Pulse Resp SpO2 Weight   03/08/22 2045 -- -- -- (!) 122 16 99 % --   03/08/22 2030 (!) 151/133 -- -- (!) 128 15 99 % --   03/08/22 1945 (!) 153/112 -- -- (!) 142 14 96 % --   03/08/22 1930 (!) 147/128 -- -- (!) 133 16 98 % --   03/08/22 1915 (!) 165/119 -- -- (!) 128 12 95 % --   03/08/22 1909 -- -- -- 108 19 100 % --   03/08/22 1900 (!) 143/120 -- -- 109 15 100 % --   03/08/22 1857 -- -- -- -- -- -- 63.5 kg (140 lb)   03/08/22 1848 (!) 125/95  -- -- 101 19 99 % --   03/08/22 1845 (!) 187/128 -- -- (!) 138 30 98 % --   03/08/22 1828 (!) 198/152 98.1  F (36.7  C) Temporal (!) 151 18 99 % --       Physical Exam  Constitutional:       Appearance: She is well-developed.   HENT:      Right Ear: External ear normal.      Left Ear: External ear normal.      Mouth/Throat:      Mouth: Mucous membranes are moist.      Pharynx: Oropharynx is clear. No oropharyngeal exudate.   Eyes:      General: No scleral icterus.     Conjunctiva/sclera: Conjunctivae normal.      Pupils: Pupils are equal, round, and reactive to light.   Neck:      Vascular: No JVD.   Cardiovascular:      Rate and Rhythm: Tachycardia present. Rhythm irregular.      Heart sounds: Normal heart sounds. No murmur heard.    No friction rub. No gallop.   Pulmonary:      Effort: Pulmonary effort is normal. No respiratory distress.      Breath sounds: Normal breath sounds. No wheezing or rales.   Abdominal:      General: Bowel sounds are normal. There is no distension.      Palpations: Abdomen is soft. There is no mass.      Tenderness: There is no abdominal tenderness.   Musculoskeletal:         General: Normal range of motion.      Cervical back: Normal range of motion and neck supple.   Skin:     General: Skin is warm and dry.      Capillary Refill: Capillary refill takes less than 2 seconds.      Findings: No rash.   Neurological:      General: No focal deficit present.      Mental Status: She is alert.       Emergency Department Course   ECG  ECG obtained at 1840, ECG read at 1846  Atrial fibrillation with rapid ventricular response  Nonspecific ST and T wave abnormality   Agree with computer interpretation.   Rate 151 bpm. NM interval * ms. QRS duration 74 ms. QT/QTc 326/516 ms. P-R-T axes * 50 114.     Imaging:  XR Chest Port 1 View:  Single AP view of the chest was obtained. Enlarged cardiac silhouette. Nodular opacity projects over the right paraspinal area, indeterminant and can be further  evaluated with dedicated chest CT if clinically warranted. No significant pleural effusion or pneumothorax.  Report per radiology.    CT Chest Pulmonary Embolism w Contrast:  1.  No obvious PE. Mild to moderate mosaic perfusion. Some loss of detail given breathing motion.  Report per radiology.    Laboratory:  CBC: WBC 7.7, HGB 12.7,   CMP: Glucose 113 (H), AST 59 (H), ALT 67 (H), o/w WNL (Creatinine 0.78)    Magnesium: 2.0    TSH: 7.81 (H)  T4 free: 0.96    Troponin I high sensitivity (Collected 1837): 19  N-Terminal Pro BNP: 1,695 (H)  INR: 1.10    Symptomatic SARS-CoV2 (COVID-19) Virus: Negative  Influenza A/B Virus: Negative    Procedures  None.    Emergency Department Course:     Reviewed:  I reviewed nursing notes, vitals and past medical history.    Assessments:  1837 I obtained history and examined the patient in ED room 28 as noted above.   1959 I rechecked the patient and explained findings.     Consults:  2053 I consulted with Dr. Cabral of the hospitalist service regarding patient, who agrees to admit patient to hospital in monitored bed.    Interventions:  1845 NS 1L IV Bolus  1845 Cardizem 15 mg IV  1900 Morphine 4 mg IV  1905 Cardizem 125 mg IV Bolus  2043 Lasix 40 mg IV    Disposition:  The patient was admitted to the hospital under the care of Dr. Cabral.     Impression & Plan       Medical Decision Making:  Patient presents from  for new onset atrial fibrillation with RVR. This has been going on for at least 1 week according to the patient.  She was started on a diltiazem drip after a bolus.  We titrated up until we obtained a heart rate around .  She was hypertensive.  She denies any this being a known diagnosis.  She has mild CHF related likely due to her atrial fibrillation.  She requires admission for further evaluation and treatment.  She remain on a diltiazem drip.  Lasix IV was given.  She is admitted to Dr. Cabral to the [facility].She is stable for admission.    Covid-19  Aamir West  was evaluated during a global COVID-19 pandemic, which necessitated consideration that the patient might be at risk for infection with the SARS-CoV-2 virus that causes COVID-19. Applicable protocols for evaluation were followed during the patient's care. COVID-19 was considered as part of the patient's evaluation. A test was obtained during this visit.    Diagnosis:    ICD-10-CM    1. Atrial fibrillation with RVR (H)  I48.91    2. Acute congestive heart failure, unspecified heart failure type (H)  I50.9          Scribe Disclosure:  Yas SHEA, am serving as a scribe at 6:37 PM on 3/8/2022 to document services personally performed by Yogesh Rubin MD based on my observations and the provider's statements to me.     This note was completed in part using Dragon voice recognition software. Although reviewed after completion, some word and grammatical errors may occur.            Yogesh Rubin MD  03/08/22 6984

## 2022-03-09 NOTE — PROGRESS NOTES
"[facility]    Hospitalist Progress Note    Date of Service (when I saw the patient): 03/09/2022  Provider:  Juventino Poole MD   Text Page  7am - 6PM       Assessment & Plan   Aamir West is a 57 year old female patient with history of hypertension, asthma, and diverticulitis.  She presented to urgent care for evaluation for shortness of breath.She has been having palpitations and shortness of breath for about 1 week preceding admission.  She states that she had some chest discomfort and took ibuprofen morning of admission. At urgent care EKG showed atrial fibrillation with rapid ventricular response.  She was sent to emergency room for evaluation. EKG in the ER showed A. fib with RVR.  BNP elevated at 1695. She was treated with diltiazem 15 mg IV and Lasix 40 mg IV.  She remained in A. fib with RVR and was started on diltiazem drip. She was admitted for further management.     1.  New onset atrial fibrillation with rapid ventricular response. [id number] score is 2 (sex, HTN).  Dyspnea and palpitation for 1 week.  NO prior history of similar symptoms. Because of possible long standing (subclinical) AF and \"at least\" one week of palpitations I will start Heparin drip until seen by Cardiology and plan outlined.  Diltiazem bolus 15  mg IV followed by diltiazem drip.  She also received Lasix 40 mg IV in the ER.     Uneventful overnight.  Cardiology conult.  Echocardiogram pending perfromance.       2.  Essential Hypertension, not on medication  Blood pressure elevated at 151/133.  Repeat blood pressure 130/109.  Currently on diltiazem drip.  Monitor blood pressure     3.  History of asthma: No evidence of asthma exacerbation at this time  As needed albuterol    DVT Prophylaxis: Heparin gtt    Code Status: Full Code    Disposition: Expected discharge in 2 days once studies complete and tt outlined.     Interval History   Not having any symptoms during my visit, had a good resting night. Daughter " is accompanying.     -Data reviewed today: I reviewed all new labs and imaging results over the last 24 hours. I personally reviewed the EKG tracing showing AF with HR < 100 in monitor.    Physical Exam   Temp: 98  F (36.7  C) Temp src: Oral BP: 122/82 Pulse: 78   Resp: 16 SpO2: 96 % O2 Device: None (Room air)    Vitals:    03/08/22 1857 03/08/22 2340   Weight: 63.5 kg (140 lb) 57.7 kg (127 lb 1.6 oz)     Vital Signs with Ranges  Temp:  [97.8  F (36.6  C)-98.7  F (37.1  C)] 98  F (36.7  C)  Pulse:  [] 78  Resp:  [12-34] 16  BP: ()/() 122/82  SpO2:  [95 %-100 %] 96 %  I/O last 3 completed shifts:  In: 108 [I.V.:108]  Out: -     GEN:  Alert, oriented x 3, appears comfortable, NAD.  HEENT:  Normocephalic/atraumatic, no scleral icterus, no nasal discharge, mouth moist.  CV:  IRIR, no murmur heard or JVD.     LUNGS:  Clear to auscultation bilaterally without rales/rhonchi/wheezing/retractions.  Symmetric chest rise on inhalation noted.  ABD:  Active bowel sounds, soft, non-tender/non-distended.  No rebound/guarding/rigidity.  EXT:  No edema or cyanosis.  No joint synovitis noted.  SKIN:  Dry to touch, no exanthems noted in the visualized areas.       Medications     dilTIAZem HCl-Sodium Chloride 5 mg/hr (03/09/22 0228)       aspirin  81 mg Oral Daily     sodium chloride (PF)  3 mL Intracatheter Q8H     sodium chloride (PF)  3 mL Intracatheter Q8H       Data   Recent Labs   Lab 03/09/22  0639 03/09/22  0035 03/08/22  2356 03/08/22  1837   WBC 8.1  --   --  7.7   HGB 11.2*  --   --  12.7   MCV 86  --   --  87     --   --  283   INR  --   --   --  1.10     --   --  141   POTASSIUM 3.5  --  3.1* 3.7   CHLORIDE 108  --   --  109   CO2 27  --   --  25   BUN 11  --   --  15   CR 0.64  --   --  0.78   ANIONGAP 5  --   --  7   URSZULA 8.6  --   --  8.8   * 115*  --  113*   ALBUMIN  --   --   --  3.6   PROTTOTAL  --   --   --  8.0   BILITOTAL  --   --   --  0.7   ALKPHOS  --   --   --  105   ALT   --   --   --  67*   AST  --   --   --  59*       Recent Results (from the past 24 hour(s))   XR Chest Port 1 View    Narrative    EXAM: XR CHEST PORT 1 VIEW  LOCATION: [facility]  DATE/TIME: 3/8/2022 6:52 PM    INDICATION: Shortness of breath.    COMPARISON: Chest x-ray on 3/3/2009.      Impression    IMPRESSION: Single AP view of the chest was obtained. Enlarged cardiac silhouette. Nodular opacity projects over the right paraspinal area, indeterminant and can be further evaluated with dedicated chest CT if clinically warranted. No significant pleural   effusion or pneumothorax.   CT Chest Pulmonary Embolism w Contrast    Narrative    EXAM: CT CHEST PULMONARY EMBOLISM W CONTRAST  LOCATION: [facility]  DATE/TIME: 3/8/2022 7:59 PM    INDICATION: Shortness of breath PE suspected, low/intermediate prob, neg D-dimer  COMPARISON: None.  TECHNIQUE: CT chest pulmonary angiogram during arterial phase injection of IV contrast. Multiplanar reformats and MIP reconstructions were performed. Dose reduction techniques were used.   CONTRAST: 54mL Isovue 370    FINDINGS:  ANGIOGRAM CHEST: There is some loss of detail given breathing motion, however no obvious pulmonary emboli are seen. There is some reflux of contrast into the IVC and the hepatic veins which can be associated with increased right heart pressure. Thoracic   aorta is negative for dissection    LUNGS AND PLEURA: Mild to moderate findings of mosaic perfusion is seen in greatest in the right lung which is nonspecific, but most commonly associated changes of air trapping from small airway inflammation.    MEDIASTINUM/AXILLAE: Normal.    CORONARY ARTERY CALCIFICATION: None.    UPPER ABDOMEN: Normal.    MUSCULOSKELETAL: Mild bilateral gynecomastia.      Impression    IMPRESSION:  1.  No obvious PE. Mild to moderate mosaic perfusion. Some loss of detail given breathing motion.             Disclaimer: This note consists of symbols  derived from keyboarding, dictation and/or voice recognition software. As a result, there may be errors in the script that have gone undetected. Please consider this when interpreting information found in this chart.

## 2022-03-09 NOTE — ED TRIAGE NOTES
Pt here with c/o SOB x5 days. Went to [facility] and found to be in afib RVR at rate 132, this is new for pt. ABC intact.

## 2022-03-09 NOTE — PHARMACY-ADMISSION MEDICATION HISTORY
Admission medication history interview status for this patient is complete. See [facility] admission navigator for allergy information, prior to admission medications and immunization status.     Medication history interview done, indicate source(s): Patient  Medication history resources (including written lists, pill bottles, clinic record): SureScripts and Care Everywhere  Pharmacy: [facility] for discharge    Changes made to PTA medication list:  Added: ibuprofen  Changed: -  Reported as Not Taking: -  Removed: -    Actions taken by pharmacist (provider contacted, etc):  Spoke with patient (Jaclyn ) to verify home med list.    Additional medication history information:None    Medication reconciliation/reorder completed by provider prior to medication history?  N   (Y/N)     Prior to Admission medications    Medication Sig Last Dose Taking? Auth Provider   ibuprofen (ADVIL/MOTRIN) 200 MG tablet Take 200 mg by mouth every 4 hours as needed for mild pain 3/8/2022 at AM Yes Unknown, Entered By History

## 2022-03-09 NOTE — PROVIDER NOTIFICATION
MD paged: Pt. is off from Diltiazam drip, Do you still want continue with IMC status? Thanks    Per MD IMC status discontinued.

## 2022-03-09 NOTE — PROGRESS NOTES
"CLINICAL NUTRITION SERVICES  -  ASSESSMENT NOTE      RECOMMENDATIONS FOR MD/PROVIDER TO ORDER:   None     Recommendations Ordered by Registered Dietitian (RD):   None     Future/Additional Recommendations:   Consider diet education if applicable per provider     Malnutrition:   Patient does not meet criteria for malnutrition         REASON FOR ASSESSMENT  Aamir West is a 57 year old female seen by Registered Dietitian for Admission Nutrition Risk Screen for positive    Patient presents with CHF, HTN, obesity, A fib    NUTRITION HISTORY  - Information obtained from patient, daughter, and chart  - Patient is on a Regular diet at home  - Typical food/fluid intake is lower in salt and avoids extra spicy foods  - Supplements none    NKFA      CURRENT NUTRITION ORDERS  Diet Order:     Low Saturated Fat/Na < 2400 mg Sodium       Current Intake/Tolerance:  Patient stated she has good appetite that is not any different from how she was eating at home.       NUTRITION FOCUSED PHYSICAL ASSESSMENT FOR DIAGNOSING MALNUTRITION)  No:  patient deferred for today                Observed:    No nutrition-related physical findings observed    Obtained from Chart/Interdisciplinary Team:  Dry skin     ANTHROPOMETRICS  Height: 4' 8\"  Weight: 127 lbs 1.6 oz  Body mass index is 28.5 kg/m .  Weight Status:  Overweight BMI 25-29.9  IBW: 40.9kg  % IBW: 141%  Weight History:   Wt Readings from Last 30 Encounters:   03/08/22 57.7 kg (127 lb 1.6 oz)   03/25/2019      67.6 kg (149 lb)    No other wt hx availble    UBW: 127lbs stated per patient    LABS  Labs reviewed     MEDICATIONS  Medications reviewed: lasix, klor-con m       ASSESSED NUTRITION NEEDS PER APPROVED PRACTICE GUIDELINES:    Dosing Weight 57.7 kg (127 lb 1.6 oz) and ABW 45.1kg    Estimated Energy Needs: 1235 kcals ([place] Jeor and activity level 1.2)  Justification: Confined to bed  Estimated Protein Needs: 46-58 grams protein (0.8-1 g pro/Kg)  Justification: " maintenance  Estimated Fluid Needs: 1355  mL (30 mL/kg)  Justification: maintenance    MALNUTRITION:  % Weight Loss:  Weight loss does not meet criteria for malnutrition as it is not significant  % Intake:  No decreased intake noted  Subcutaneous Fat Loss:  Unable to assess  Muscle Loss:  Unable to assess  Fluid Retention:  Unable to assess    Malnutrition Diagnosis: Patient does not meet two of the above criteria necessary for diagnosing malnutrition    NUTRITION DIAGNOSIS:  No nutrition diagnosis identified at this time related       NUTRITION INTERVENTIONS  Recommendations / Nutrition Prescription  None  .      Implementation  Nutrition education: Per Provider order if indicated   Collaboration and Referral of Nutrition care  .      Nutrition Goals  None at this time  .      MONITORING AND EVALUATION:  Progress towards goals will be monitored and evaluated per protocol and Practice Guidelines

## 2022-03-09 NOTE — CONSULTS
Cardiology Consultation:    Aamir West MRN#: [id number]   YOB: 1965 Age: 57 year old     Date of Admission: 3/8/2022  Consult indication: atrial fibrillation          Assessment and Plan / Recommendations:      # Atrial fibrillation, new diagnosis, chronicity unknown, [id number] 3 (CHF, hypertension, gender), currently rate controlled, asymptomatic  # Mild LV dysfunction, LVEF 45-50%, global hypokinesis, normal troponin, likely due to nonischemic cardiomyopathy related to atrial fibrillation  # TR, mod-severe  # HTN  # Obesity  # Possible undx QUINCY    -Discussed options for further management of atrial fibrillation including rhythm control strategy with cardioversion, versus rate control strategy.  Discussed risk/benefits, advantages/limitations of each strategy at length.  Patient would like to avoid invasive procedures, and so has not interested in AMOL/DCCV, fortunately rates have been well controlled and she remains asymptomatic, so we will continue with a rate control strategy, will convert diltiazem to p.o. regimen dosing  -Given mild LV dysfunction, [id number] score increases to 3, discussed risk/benefits of anticoagulation for the primary prevention of stroke, including risks of bleeding, after an in-depth discussion, patient was amenable to starting anticoagulation.  We will place Pharmacy liaison consultation.  Recommend starting DOAC that is most cost effective.  -Blood pressures have decreased significantly with diltiazem, ideally would want to be on low-dose ACE inhibitor/ARB, however doubt blood pressures would allow for this.  Given only mild LV dysfunction, and effective rate control diltiazem, will continue with diltiazem rather than switching to metoprolol succinate.  -Will dose furosemide once again today, may need to be on regular diuretics but will hold off on empiric outpatient diuretics for now since she is close to euvolemic  -Holter monitor on discharge.  -Follow-up in  cardiology clinic (orders placed in discharge navigator), including labs  -Sleep Medicine consultation outpatient  -Cardiology will sign off    Thank you for allowing our team to participate in the care of Aamir West. Please do not hesitate to page me with any questions or concerns.    Jon Chávez MD, [facility]  Cardiology  Text Page   March 9, 2022    Voice recognition software utilized.          History of Present Illness:     I had the opportunity to see patient Aamir West at [facility] for a cardiology consultation.     As you know, patient is a 57-year-old female with a past medical history significant for hypertension, asthma, who was admitted on 3/8/2022 with worsening dyspnea, found to be in atrial fibrillation with RVR.    Patient is Cambodian speaking, encounter is on with a licensed Cambodian .  Patient reports that she was in her usual state of health until 1 to 2 weeks ago, when she started developing intermittent chest discomfort and palpitations.  These episodes are also associated with shortness of breath.  Symptoms were sporadic in nature, and not necessarily associated with physical activity.  Due to the persistent nature of her symptoms, she presented to urgent care, and ultimately was referred to the ED.  In the ED, initial blood pressure is elevated at 198/152 mmHg, heart rate 151 bpm.  ECG demonstrates atrial fibrillation with RVR, nonspecific ST segment changes.  Initial labs were notable for potassium 3.7, creatinine 0.78, NT proBNP 1700, troponin normal (high-sensitivity assay).  CT PE study was done which was negative for PE.  Patient was admitted to the Internal Medicine service, and was administered furosemide 40 mg IV once, started on diltiazem gtt., and heparin GTT.    TTE 3/9/2022 demonstrates LVEF 45 to 50% with global hypokinesis, moderate severe to severe tricuspid regurgitation, dilated IVC.    Heart rates overnight have been in the 70s to 80s beats per minute range,  remains in atrial fibrillation, currently asymptomatic.  Blood pressure now 110/73 mmHg.    Patient does endorse snoring and nonrestorative sleep, drinks about a cup of coffee a day.  No regular alcohol use.  No family history of arrhythmia.         Past Medical History:   I have reviewed this patient's past medical history  HTN  Obesity         Past Surgical History:   I have reviewed this patient's past surgical history   No prior cardiac surgical history          Social History:   I have reviewed this patient's social history  Social History     Tobacco Use     Smoking status: Not on file     Smokeless tobacco: Not on file   Substance Use Topics     Alcohol use: Not on file             Family History:   I have reviewed this patient's family history  No family history on file.          Allergies:   I have reviewed this patient's allergy history  No Known Allergies          Medications reviewed:   Prior to admission medications:  Prior to Admission medications    Medication Sig Start Date End Date Taking? Authorizing Provider   ibuprofen (ADVIL/MOTRIN) 200 MG tablet Take 200 mg by mouth every 4 hours as needed for mild pain   Yes Unknown, Entered By History      Current medications:  Current Facility-Administered Medications Ordered in Epic   Medication Dose Route Frequency Last Rate Last Admin     diltiazem ER (CARDIZEM SR) 12 hr capsule 90 mg  90 mg Oral BID         heparin infusion 25,000 units in D5W 250 mL ANTICOAGULANT  0-5,000 Units/hr Intravenous Continuous 7 mL/hr at 03/09/22 0955 700 Units/hr at 03/09/22 0955     lidocaine (LMX4) cream   Topical Q1H PRN         lidocaine 1 % 0.1-1 mL  0.1-1 mL Other Q1H PRN         lisinopril (ZESTRIL) tablet 2.5 mg  2.5 mg Oral Daily         melatonin tablet 1 mg  1 mg Oral At Bedtime PRN         naloxone (NARCAN) injection 0.2 mg  0.2 mg Intravenous Q2 Min PRN        Or     naloxone (NARCAN) injection 0.4 mg  0.4 mg Intravenous Q2 Min PRN        Or     naloxone (NARCAN)  injection 0.2 mg  0.2 mg Intramuscular Q2 Min PRN        Or     naloxone (NARCAN) injection 0.4 mg  0.4 mg Intramuscular Q2 Min PRN         nitroGLYcerin (NITROSTAT) sublingual tablet 0.4 mg  0.4 mg Sublingual Q5 Min PRN         ondansetron (ZOFRAN-ODT) ODT tab 4 mg  4 mg Oral Q6H PRN        Or     ondansetron (ZOFRAN) injection 4 mg  4 mg Intravenous Q6H PRN         sodium chloride (PF) 0.9% PF flush 3 mL  3 mL Intracatheter Q8H         sodium chloride (PF) 0.9% PF flush 3 mL  3 mL Intracatheter q1 min prn         sodium chloride (PF) 0.9% PF flush 3 mL  3 mL Intracatheter Q8H         sodium chloride (PF) 0.9% PF flush 3 mL  3 mL Intracatheter q1 min prn         No current Epic-ordered outpatient medications on file.             Review of Systems:   A complete review of systems was performed and was negative except as mentioned in the HPI.          Physical Exam:   Vital signs were personally reviewed:  Temperatures:  Current - Temp: 97.9  F (36.6  C); Max - Temp  Av  F (36.7  C)  Min: 97.8  F (36.6  C)  Max: 98.7  F (37.1  C)  Respiration range: Resp  Av.3  Min: 12  Max: 34  Pulse range: Pulse  Av.2  Min: 70  Max: 151  Blood pressure range: Systolic (24hrs), Av , Min:96 , Max:198   ; Diastolic (24hrs), Av, Min:68, Max:152    Pulse oximetry range: SpO2  Av.3 %  Min: 95 %  Max: 100 %    Intake/Output Summary (Last 24 hours) at 3/9/2022 1241  Last data filed at 3/9/2022 0647  Gross per 24 hour   Intake 108 ml   Output --   Net 108 ml     127 lbs 1.6 oz  Body mass index is 28.5 kg/m .   Body surface area is 1.51 meters squared.    Constitutional: appears stated age, in no apparent distress, appears to be well nourished  Head: normocephalic, atraumatic  Neck: supple, trachea midline, no bruit bilaterally   Pulmonary: clear to auscultation bilaterally, no wheezes, no rales, no increased work of breathing  Cardiovascular: regular rate, irregularly irregular rhythm, 1/6 BARRERA at the LUSB, no  lower extremity edema  Gastrointestinal: no guarding, non-rigid   Neurologic: awake, alert, moves all extremities  Skin: no jaundice, warm on limited exam  Psychiatric: affect is normal, answers questions appropriately, oriented to self and place         Laboratory tests:   Laboratory tests personally reviewed:   CMP  Recent Labs   Lab 22  0639 22  0035 22  2356 22  1837     --   --  141   POTASSIUM 3.5  --  3.1* 3.7   CHLORIDE 108  --   --  109   CO2 27  --   --  25   ANIONGAP 5  --   --  7   * 115*  --  113*   BUN 11  --   --  15   CR 0.64  --   --  0.78   GFRESTIMATED >90  --   --  89   URSZULA 8.6  --   --  8.8   MAG  --   --   --  2.0   PROTTOTAL  --   --   --  8.0   ALBUMIN  --   --   --  3.6   BILITOTAL  --   --   --  0.7   ALKPHOS  --   --   --  105   AST  --   --   --  59*   ALT  --   --   --  67*     CBC  Recent Labs   Lab 22  0933 22  0639 22  1837   WBC 7.5 8.1 7.7   RBC 4.34 4.08 4.64   HGB 11.9 11.2* 12.7   HCT 36.5 34.9* 40.5   MCV 84 86 87   MCH 27.4 27.5 27.4   MCHC 32.6 32.1 31.4*   RDW 14.2 13.7 14.0    250 283     INR  Recent Labs   Lab 22  1837   INR 1.10     No results found for: TROPI, TROPONIN, TROPR, TROPN  No results for input(s): CHOL, HDL, LDL, TRIG, CHOLHDLRATIO in the last 61727 hours.  No results found for: A1C  TSH   Date Value Ref Range Status   2022 7.81 (H) 0.40 - 4.00 mU/L Final            Imaging and Additional Data:   Additional data personally reviewed:  Recent Results (from the past 4320 hour(s))   Echocardiogram Complete   Result Value    Biplane LVEF 48%    Narrative    [id number]  [id number]  [id number]  [id number]^LAM^RIN^SOREN     [facility]  Echocardiography Laboratory  [address] [place] [address]     Name: BISI GUTIERREZ  MRN: [id number]  : 1965  Study Date: 2022 08:19 AM  Age: 57 yrs  Gender: Female  Patient Location: [facility]  Reason For Study: Atrial  Fibrillation  Ordering Physician: RIN FRITZ  Referring Physician: [facility] [place]  Performed By: Joslyn Mari RDCS     BSA: 1.5 m2  Height: 56 in  Weight: 127 lb  HR: 84  BP: 121/86 mmHg  ______________________________________________________________________________  Procedure  Complete Portable Echo Adult. Optison (NDC #[id number]) given intravenously.  ______________________________________________________________________________  Interpretation Summary     Left ventricular systolic function is mildly reduced.  Biplane LVEF is 48%.  There is mild global hypokinesia of the left ventricle.  The right ventricle is mildly dilated.  There is mod-severe to severe (3-4+) tricuspid regurgitation.  The right ventricular systolic function is borderline reduced.  The study was technically adequate. There is no comparison study available.  ______________________________________________________________________________  Left Ventricle  The left ventricle is normal in size. There is concentric remodeling present.  Left ventricular systolic function is mildly reduced. Biplane LVEF is 48%.  Diastolic function not assessed due to atrial fibrillation. There is mild  global hypokinesia of the left ventricle.     Right Ventricle  The right ventricle is mildly dilated. The right ventricular systolic function  is borderline reduced.     Atria  The left atrium is moderately dilated. The right atrium is mild to moderately  dilated.     Mitral Valve  There is trace mitral regurgitation.     Tricuspid Valve  There is mod-severe to severe (3-4+) tricuspid regurgitation. The right  ventricular systolic pressure is approximated at 23.4 mmHg plus the right  atrial pressure. IVC diameter >2.1 cm collapsing <50% with sniff suggests a  high RA pressure estimated at 15 mmHg or greater. Right ventricular systolic  pressure is elevated, consistent with mild pulmonary hypertension.     Aortic Valve  There is mild trileaflet  aortic sclerosis. There is trace to mild aortic  regurgitation. No aortic stenosis is present.     Pulmonic Valve  The pulmonic valve is not well seen, but is grossly normal.     Vessels  The aortic root is normal size.     Pericardium  Trivial pericardial effusion.     Rhythm  The rhythm was atrial fibrillation.  ______________________________________________________________________________  MMode/2D Measurements & Calculations  IVSd: 1.3 cm     LVIDd: 3.9 cm  LVIDs: 3.2 cm  LVPWd: 0.99 cm  FS: 17.5 %  LV mass(C)d: 146.1 grams  LV mass(C)dI: 99.8 grams/m2  Ao root diam: 3.3 cm  LA dimension: 4.8 cm  asc Aorta Diam: 3.5 cm  LA/Ao: 1.5  LVOT diam: 1.9 cm  LVOT area: 2.8 cm2  LA Volume (BP): 62.4 ml  LA Volume Index (BP): 42.7 ml/m2  RWT: 0.51     Doppler Measurements & Calculations  MV E max makenzie: 93.6 cm/sec  MV max P.1 mmHg  MV mean P.0 mmHg  MV V2 VTI: 18.7 cm  MVA(VTI): 2.7 cm2  MV dec time: 0.16 sec  AI P1/2t: 582.6 msec  LV V1 max P.7 mmHg  LV V1 max: 107.6 cm/sec  LV V1 VTI: 17.8 cm  SV(LVOT): 50.5 ml  SI(LVOT): 34.5 ml/m2  PA acc time: 0.08 sec  TR max makenzie: 241.5 cm/sec  TR max P.4 mmHg  E/E' av.3  Lateral E/e': 11.3  Medial E/e': 15.3     ______________________________________________________________________________  Report approved by: Mickie Cohen 2022 09:53 AM            Clinically Significant Risk Factors Present on Admission                  Cardiovascular: Cardiac Arrhythmia: Atrial fibrillation: Paroxysmal    Fluid & Electrolyte Disorders: Fluid overload, unspecified

## 2022-03-09 NOTE — H&P
[facility]    History and Physical  Hospitalist       Date of Admission:  3/8/2022  Date of Service (when I saw the patient): 03/08/22    Assessment & Plan   Aamir West is a 57 year old female patient with history of hypertension, asthma, diverticulitis who was seen at urgent care today for evaluation for shortness of breath. Patient stated that she has been having palpitations and shortness of breath which has been going on for 1 week.  She states that she had some chest discomfort and took ibuprofen this morning. She was seen at urgent care today and had EKG which showed atrial fibrillation with rapid ventricular response.  She was sent to emergency room for evaluation. EKG in the ER showed A. fib with RVR.  BNP elevated at 1695. She was given diltiazem 15 mg IV and Lasix 40 mg IV.  She remained in A. fib with RVR and was started on diltiazem drip. She was admitted for further management.    1.  New onset atrial fibrillation with rapid ventricular response  She has been having shortness of breath and palpitation for 1 week.  Patient denies prior history of similar symptoms.  She was given diltiazem 15  mg IV and started on diltiazem drip.  She also received Lasix 40 mg IV in the ER.  Currently looks comfortable.  [id number] score is 2.  Will consult cardiology for recommendations regarding long-term anticoagulation  We will continue diltiazem drip.  Will order echocardiogram.      2.  Hypertension, not on medication  Blood pressure elevated at 151/133.  Repeat blood pressure 130/109.  Currently on diltiazem drip.  Will monitor blood pressure    3.  History of asthma: No evidence of asthma exacerbation at this time  As needed albuterol    We will admit patient to telemetry floor    DVT Prophylaxis: Pneumatic Compression Devices  Code Status: Full Code    Disposition: Expected discharge in 1-2 days    Susan Cabral MD    Primary Care Physician   [facility]    Chief Complaint     Shortness of breath, palpitation    History is obtained from the patient    History of Present Illness   Aamir West is a 57 year old female patient with history of hypertension, asthma, diverticulitis who was seen at urgent care today for evaluation for shortness of breath.  Patient stated that she has been having palpitations and shortness of breath which has been going on for 1 week.  She states that she had some chest discomfort and took ibuprofen this morning.  She denies associated cough or fever.  She also denies nausea, vomiting, abdominal pain, dysuria, urgency or frequency.  EKG was done at urgent care and she was noted to be in A. fib with RVR.  She was sent to emergency room for further evaluation and management.  Patient denies prior history of atrial fibrillation.  On arrival to emergency room, her vital signs were checked and showed temperature 98.1, pulse 142, blood pressure 125/95, oxygen saturation 99% on room air.  Laboratory work-up showed normal BMP, ALT 67, AST 59, BNP 1695.  Troponin 19.  WBC 7.7, hemoglobin 12.7.  EKG showed atrial fibrillation with rapid ventricular response.  She was given diltiazem 15 mg IV, Lasix 40 mg IV.  She remained in A. fib with RVR and started on diltiazem drip.  She was admitted to the hospital for further management.    Past Medical History    I have reviewed this patient's medical history and updated it with pertinent information if needed.   Asthma    Past Surgical History   I have reviewed this patient's surgical history and updated it with pertinent information if needed.  No past surgical history on file.    Prior to Admission Medications   Prior to Admission Medications   Prescriptions Last Dose Informant Patient Reported? Taking?   ibuprofen (ADVIL/MOTRIN) 200 MG tablet 3/8/2022 at AM  Yes Yes   Sig: Take 200 mg by mouth every 4 hours as needed for mild pain      Facility-Administered Medications: None     Allergies   No Known Allergies    Social History    I have reviewed this patient's social history and updated it with pertinent information if needed. Aamir Wset      Family History   I have reviewed this patient's family history and updated it with pertinent information if needed.   No family history on file.    Review of Systems   The 10 point Review of Systems is negative other than noted in the HPI or here. Palpitations, shortness of breath    Physical Exam   Temp: 98.1  F (36.7  C) Temp src: Temporal BP: (!) 151/133 Pulse: (!) 122   Resp: 16 SpO2: 99 % O2 Device: None (Room air)    Vital Signs with Ranges  Temp:  [98.1  F (36.7  C)] 98.1  F (36.7  C)  Pulse:  [101-151] 122  Resp:  [12-30] 16  BP: (125-198)/() 151/133  SpO2:  [95 %-100 %] 99 %  140 lbs 0 oz    GEN:  Alert, oriented x 3, appears comfortable, NAD.  HEENT:  Normocephalic/atraumatic, no scleral icterus, no nasal discharge, mouth moist.  CV:  Regular rate and rhythm, no murmur or JVD.  S1 + S2 noted, no S3 or S4.  LUNGS:  Clear to auscultation bilaterally without rales/rhonchi/wheezing/retractions.  Symmetric chest rise on inhalation noted.  ABD:  Active bowel sounds, soft, non-tender/non-distended.  No rebound/guarding/rigidity.  EXT:  No edema or cyanosis.  Hands/feet warm to touch with good signs of peripheral perfusion.  No joint synovitis noted.  SKIN:  Dry to touch, no exanthems noted in the visualized areas.  NEURO:  Symmetric muscle strength, sensation to touch grossly intact.  No new focal deficits appreciated.    Data   Data reviewed today:  I personally reviewed  Recent Labs   Lab 03/08/22  1837   WBC 7.7   HGB 12.7   MCV 87      INR 1.10      POTASSIUM 3.7   CHLORIDE 109   CO2 25   BUN 15   CR 0.78   ANIONGAP 7   URSZULA 8.8   *   ALBUMIN 3.6   PROTTOTAL 8.0   BILITOTAL 0.7   ALKPHOS 105   ALT 67*   AST 59*       Recent Results (from the past 24 hour(s))   XR Chest Port 1 View    Narrative    EXAM: XR CHEST PORT 1 VIEW  LOCATION: [facility]OSPITAL  DATE/TIME: 3/8/2022 6:52 PM    INDICATION: Shortness of breath.    COMPARISON: Chest x-ray on 3/3/2009.      Impression    IMPRESSION: Single AP view of the chest was obtained. Enlarged cardiac silhouette. Nodular opacity projects over the right paraspinal area, indeterminant and can be further evaluated with dedicated chest CT if clinically warranted. No significant pleural   effusion or pneumothorax.   CT Chest Pulmonary Embolism w Contrast    Narrative    EXAM: CT CHEST PULMONARY EMBOLISM W CONTRAST  LOCATION: [facility]  DATE/TIME: 3/8/2022 7:59 PM    INDICATION: Shortness of breath PE suspected, low/intermediate prob, neg D-dimer  COMPARISON: None.  TECHNIQUE: CT chest pulmonary angiogram during arterial phase injection of IV contrast. Multiplanar reformats and MIP reconstructions were performed. Dose reduction techniques were used.   CONTRAST: 54mL Isovue 370    FINDINGS:  ANGIOGRAM CHEST: There is some loss of detail given breathing motion, however no obvious pulmonary emboli are seen. There is some reflux of contrast into the IVC and the hepatic veins which can be associated with increased right heart pressure. Thoracic   aorta is negative for dissection    LUNGS AND PLEURA: Mild to moderate findings of mosaic perfusion is seen in greatest in the right lung which is nonspecific, but most commonly associated changes of air trapping from small airway inflammation.    MEDIASTINUM/AXILLAE: Normal.    CORONARY ARTERY CALCIFICATION: None.    UPPER ABDOMEN: Normal.    MUSCULOSKELETAL: Mild bilateral gynecomastia.      Impression    IMPRESSION:  1.  No obvious PE. Mild to moderate mosaic perfusion. Some loss of detail given breathing motion.

## 2022-03-09 NOTE — CONSULTS
Anticoagulation coverage check.  Patient has [employer] through an employer with $3950 (of $3950) deductible remaining.    This plan does not cover Eliquis.     Xarelto:  $509/mo.  [facility] can provide 1 mo free, and a copay savings card to reduce this to $10 per fill (up to a 90 day supply) for the first 90 days, and then $309/mo thereafter.  Note: If hospital stay will count towards deductible, cost may be less.     Jantoven (warfarin): $5/mo.        --JAMI Charles, Pharmacy Technician/Liaison, [facility] [phone]

## 2022-03-09 NOTE — ED NOTES
[facility]  ED Nurse Handoff Report    Aamir West is a 57 year old female   ED Chief complaint: Shortness of Breath  . ED Diagnosis:   Final diagnoses:   Atrial fibrillation with RVR (H)   Acute congestive heart failure, unspecified heart failure type (H)     Allergies: No Known Allergies    Code Status: Full Code  Activity level - Baseline/Home:  Independent. Activity Level - Current:   Stand by Assist. Lift room needed: No. Bariatric: No   Needed: No   Isolation: Yes. Infection: Not Applicable.     Vital Signs:   Vitals:    03/08/22 1930 03/08/22 1945 03/08/22 2030 03/08/22 2045   BP: (!) 147/128 (!) 153/112 (!) 151/133    Pulse: (!) 133 (!) 142 (!) 128 (!) 122   Resp: 16 14 15 16   Temp:       TempSrc:       SpO2: 98% 96% 99% 99%   Weight:           Cardiac Rhythm:  ,   Cardiac  Cardiac Rhythm: Atrial fibrillation  Pain level:    Patient confused: No. Patient Falls Risk: No.   Elimination Status: Has voided   Patient Report - Initial Complaint: Patient has been having increased SOB . Focused Assessment: Patient has been having increased SOB for the last few days went to urgent care and was found to be in AFIB with RVR   Tests Performed: Labs, Imaging. Abnormal Results:   Labs Ordered and Resulted from Time of ED Arrival to Time of ED Departure   COMPREHENSIVE METABOLIC PANEL - Abnormal       Result Value    Sodium 141      Potassium 3.7      Chloride 109      Carbon Dioxide (CO2) 25      Anion Gap 7      Urea Nitrogen 15      Creatinine 0.78      Calcium 8.8      Glucose 113 (*)     Alkaline Phosphatase 105      AST 59 (*)     ALT 67 (*)     Protein Total 8.0      Albumin 3.6      Bilirubin Total 0.7      GFR Estimate 89     TSH WITH FREE T4 REFLEX - Abnormal    TSH 7.81 (*)    NT PROBNP INPATIENT - Abnormal    N terminal Pro BNP Inpatient 1,695 (*)    CBC WITH PLATELETS AND DIFFERENTIAL - Abnormal    WBC Count 7.7      RBC Count 4.64      Hemoglobin 12.7      Hematocrit 40.5      MCV 87       MCH 27.4      MCHC 31.4 (*)     RDW 14.0      Platelet Count 283      % Neutrophils 64      % Lymphocytes 26      % Monocytes 7      % Eosinophils 3      % Basophils 0      % Immature Granulocytes 0      NRBCs per 100 WBC 0      Absolute Neutrophils 4.9      Absolute Lymphocytes 2.0      Absolute Monocytes 0.5      Absolute Eosinophils 0.2      Absolute Basophils 0.0      Absolute Immature Granulocytes 0.0      Absolute NRBCs 0.0     INR - Normal    INR 1.10     TROPONIN I - Normal    Troponin I High Sensitivity 19     MAGNESIUM - Normal    Magnesium 2.0     INFLUENZA A/B & SARS-COV2 PCR MULTIPLEX - Normal    Influenza A PCR Negative      Influenza B PCR Negative      SARS CoV2 PCR Negative     T4 FREE - Normal    Free T4 0.96       CT Chest Pulmonary Embolism w Contrast   Final Result   IMPRESSION:   1.  No obvious PE. Mild to moderate mosaic perfusion. Some loss of detail given breathing motion.      XR Chest Port 1 View   Final Result   IMPRESSION: Single AP view of the chest was obtained. Enlarged cardiac silhouette. Nodular opacity projects over the right paraspinal area, indeterminant and can be further evaluated with dedicated chest CT if clinically warranted. No significant pleural    effusion or pneumothorax.      .   Treatments provided: Medications  Family Comments:  Present  OBS brochure/video discussed/provided to patient:  No  ED Medications:   Medications   morphine (PF) injection 4 mg (4 mg Intravenous Given 3/8/22 1900)   diltiazem (CARDIZEM) 125 mg in sodium chloride 0.9 % 125 mL infusion (15 mg/hr Intravenous Rate/Dose Change 3/8/22 2059)   0.9% sodium chloride BOLUS (1,000 mLs Intravenous New Bag 3/8/22 1845)   diltiazem (CARDIZEM) injection 15 mg (15 mg Intravenous Given 3/8/22 1845)   CT SCAN FLUSH (75 mLs Intravenous Given 3/8/22 2003)   iopamidol (ISOVUE-370) solution 500 mL (54 mLs Intravenous Given 3/8/22 2003)   furosemide (LASIX) injection 40 mg (40 mg Intravenous Given 3/8/22  2043)     Drips infusing:  No  For the majority of the shift, the patient's behavior Green. Interventions performed were None.    Sepsis treatment initiated: No     Patient tested for COVID 19 prior to admission: YES    ED Nurse Name/Phone Number: Jina Carbajal RN,   9:37 PM

## 2022-03-09 NOTE — ED NOTES
[facility]  ED Nurse Handoff Report    Aamir West is a 57 year old female   ED Chief complaint: Shortness of Breath  . ED Diagnosis:   Final diagnoses:   Atrial fibrillation with RVR (H)   Acute congestive heart failure, unspecified heart failure type (H)     Allergies: No Known Allergies    Code Status: Full Code  Activity level - Baseline/Home:  Independent. Activity Level - Current:   Stand by Assist. Lift room needed: No. Bariatric: No   Needed: No   Isolation: No. Infection: Not Applicable.     Vital Signs:   Vitals:    03/08/22 1930 03/08/22 1945 03/08/22 2030 03/08/22 2045   BP: (!) 147/128 (!) 153/112 (!) 151/133    Pulse: (!) 133 (!) 142 (!) 128 (!) 122   Resp: 16 14 15 16   Temp:       TempSrc:       SpO2: 98% 96% 99% 99%   Weight:           Cardiac Rhythm:  ,   Cardiac  Cardiac Rhythm: Atrial fibrillation  Pain level:    Patient confused: No. Patient Falls Risk: No.   Elimination Status: Has voided   Patient Report - Initial Complaint: Patient having increased SOB for 5 days Focused Assessment: Patient has been having ongoing SOB for 5 days went to urgent care and ws found to have a heart rate at 132 and was in AFIB with RVR   Tests Performed: Labs, imaging. Abnormal Results:   Labs Ordered and Resulted from Time of ED Arrival to Time of ED Departure   COMPREHENSIVE METABOLIC PANEL - Abnormal       Result Value    Sodium 141      Potassium 3.7      Chloride 109      Carbon Dioxide (CO2) 25      Anion Gap 7      Urea Nitrogen 15      Creatinine 0.78      Calcium 8.8      Glucose 113 (*)     Alkaline Phosphatase 105      AST 59 (*)     ALT 67 (*)     Protein Total 8.0      Albumin 3.6      Bilirubin Total 0.7      GFR Estimate 89     TSH WITH FREE T4 REFLEX - Abnormal    TSH 7.81 (*)    NT PROBNP INPATIENT - Abnormal    N terminal Pro BNP Inpatient 1,695 (*)    CBC WITH PLATELETS AND DIFFERENTIAL - Abnormal    WBC Count 7.7      RBC Count 4.64      Hemoglobin 12.7      Hematocrit  40.5      MCV 87      MCH 27.4      MCHC 31.4 (*)     RDW 14.0      Platelet Count 283      % Neutrophils 64      % Lymphocytes 26      % Monocytes 7      % Eosinophils 3      % Basophils 0      % Immature Granulocytes 0      NRBCs per 100 WBC 0      Absolute Neutrophils 4.9      Absolute Lymphocytes 2.0      Absolute Monocytes 0.5      Absolute Eosinophils 0.2      Absolute Basophils 0.0      Absolute Immature Granulocytes 0.0      Absolute NRBCs 0.0     INR - Normal    INR 1.10     TROPONIN I - Normal    Troponin I High Sensitivity 19     MAGNESIUM - Normal    Magnesium 2.0     INFLUENZA A/B & SARS-COV2 PCR MULTIPLEX - Normal    Influenza A PCR Negative      Influenza B PCR Negative      SARS CoV2 PCR Negative     T4 FREE - Normal    Free T4 0.96       CT Chest Pulmonary Embolism w Contrast   Final Result   IMPRESSION:   1.  No obvious PE. Mild to moderate mosaic perfusion. Some loss of detail given breathing motion.      XR Chest Port 1 View   Final Result   IMPRESSION: Single AP view of the chest was obtained. Enlarged cardiac silhouette. Nodular opacity projects over the right paraspinal area, indeterminant and can be further evaluated with dedicated chest CT if clinically warranted. No significant pleural    effusion or pneumothorax.         Treatments provided: Labs, Imaging  Family Comments:  present  OBS brochure/video discussed/provided to patient:  N/A  ED Medications:   Medications   morphine (PF) injection 4 mg (4 mg Intravenous Given 3/8/22 1900)   diltiazem (CARDIZEM) 125 mg in sodium chloride 0.9 % 125 mL infusion (10 mg/hr Intravenous Rate/Dose Change 3/8/22 2043)   0.9% sodium chloride BOLUS (1,000 mLs Intravenous New Bag 3/8/22 1845)   diltiazem (CARDIZEM) injection 15 mg (15 mg Intravenous Given 3/8/22 1845)   CT SCAN FLUSH (75 mLs Intravenous Given 3/8/22 2003)   iopamidol (ISOVUE-370) solution 500 mL (54 mLs Intravenous Given 3/8/22 2003)   furosemide (LASIX) injection 40 mg (40 mg  Intravenous Given 3/8/22 2043)     Drips infusing:  Yes  For the majority of the shift, the patient's behavior Green. Interventions performed were None.    Sepsis treatment initiated: No     Patient tested for COVID 19 prior to admission: YES    ED Nurse Name/Phone Number: Jina Carbajal RN,   8:57 PM  RECEIVING UNIT ED HANDOFF REVIEW    Above ED Nurse Handoff Report was reviewed: Yes  Reviewed by: Heidi Douglas RN on March 8, 2022 at 9:36 PM

## 2022-03-10 ENCOUNTER — APPOINTMENT (OUTPATIENT)
Dept: CARDIOLOGY | Facility: CLINIC | Age: 57
DRG: 308 | End: 2022-03-10
Attending: HOSPITALIST

## 2022-03-10 VITALS
OXYGEN SATURATION: 99 % | SYSTOLIC BLOOD PRESSURE: 112 MMHG | DIASTOLIC BLOOD PRESSURE: 88 MMHG | HEART RATE: 79 BPM | WEIGHT: 127.1 LBS | HEIGHT: 56 IN | TEMPERATURE: 97.4 F | BODY MASS INDEX: 28.59 KG/M2 | RESPIRATION RATE: 18 BRPM

## 2022-03-10 LAB — UFH PPP CHRO-ACNC: 0.43 IU/ML

## 2022-03-10 PROCEDURE — 250N000011 HC RX IP 250 OP 636: Performed by: HOSPITALIST

## 2022-03-10 PROCEDURE — 250N000013 HC RX MED GY IP 250 OP 250 PS 637: Performed by: INTERNAL MEDICINE

## 2022-03-10 PROCEDURE — 85520 HEPARIN ASSAY: CPT | Performed by: HOSPITALIST

## 2022-03-10 PROCEDURE — 99239 HOSP IP/OBS DSCHRG MGMT >30: CPT | Performed by: HOSPITALIST

## 2022-03-10 PROCEDURE — 36415 COLL VENOUS BLD VENIPUNCTURE: CPT | Performed by: HOSPITALIST

## 2022-03-10 RX ORDER — RIVAROXABAN 15 MG-20MG
KIT ORAL
Qty: 1 EACH | Refills: 0 | Status: SHIPPED | OUTPATIENT
Start: 2022-03-10

## 2022-03-10 RX ORDER — DILTIAZEM HYDROCHLORIDE 90 MG/1
90 CAPSULE, EXTENDED RELEASE ORAL 2 TIMES DAILY
Qty: 60 CAPSULE | Refills: 0 | Status: SHIPPED | OUTPATIENT
Start: 2022-03-10 | End: 2022-04-09

## 2022-03-10 RX ADMIN — HEPARIN SODIUM AND DEXTROSE 1000 UNITS/HR: 10000; 5 INJECTION INTRAVENOUS at 08:02

## 2022-03-10 RX ADMIN — DILTIAZEM HYDROCHLORIDE 90 MG: 90 CAPSULE, EXTENDED RELEASE ORAL at 08:30

## 2022-03-10 ASSESSMENT — ACTIVITIES OF DAILY LIVING (ADL)
ADLS_ACUITY_SCORE: 5
ADLS_ACUITY_SCORE: 7
ADLS_ACUITY_SCORE: 5
ADLS_ACUITY_SCORE: 7
ADLS_ACUITY_SCORE: 5
ADLS_ACUITY_SCORE: 7
ADLS_ACUITY_SCORE: 5
ADLS_ACUITY_SCORE: 7

## 2022-03-10 NOTE — PLAN OF CARE
Goal Outcome Evaluation:    A&OX4. Up independently. VSS. Tele: Afib CVR. Diltiazem discontinued at 1300PM and PO diltiazem started. Heparin dripp infusing @ 850untis/hr, recheck Hep10A ordered at 2145PM. Echo is done today, EF 45-50%. Cardiac diet. Per Cardiology Holter monitor on discharge. Will continue to monitor.

## 2022-03-10 NOTE — DISCHARGE SUMMARY
[facility]    Discharge Summary  Hospitalist    Date of Admission:  3/8/2022  Date of Discharge:  3/10/2022  Provider:  Juventino Poole MD  Date of Service (when I last saw the patient): 03/10/22    Discharge Diagnoses    1.  Atrial fibrillation, new diagnosis, chronicity unknown, [id number] 3 (CHF, hypertension, gender), currently rate controlled, asymptomatic   2.  Non-ischemic cardiomyopathy rate related. Mild Acute SHF, LVEF 45-50%, global hypokinesis, normal troponin.     3.  TR, mod-severe   4.  HTN   5.  History of asthma.   6.  Nutritional status, BMI 28.5     7.  Possible undx QUINCY   8.  Subclinical hypothyroidism.   9.  Mild transaminitis, suspect liver congestion, other causes not excluded       Other medical issues:  No past medical history on file.    History of Present Illness   Aamir West is an 57 year old female who presented with dyspnea.  Please see the admission history and physical for full details.    Hospital Course   Aamir West is a 57 year old female patient with history of hypertension, asthma, and diverticulitis.  She presented to urgent care for evaluation for shortness of breath.She has been having palpitations and shortness of breath for about 1 week preceding admission.  She states that she had some chest discomfort and took ibuprofen morning of admission. At urgent care EKG showed atrial fibrillation with rapid ventricular response.  She was sent to emergency room for evaluation. EKG in the ER showed A. fib with RVR.  BNP elevated at 1695. She was treated with diltiazem 15 mg IV and Lasix 40 mg IV.  She remained in A. fib with RVR and was started on diltiazem drip. She was admitted for further management.    Hospital course has been uneventful.  In discussion with cardiology the patient has opted for rate control and DOAC.  It was reviewed by me over the phone with her daughter who agrees with her mother decision.  She will continue follow-up with her primary  "care physician and cardiology.  She is not on any diuretic on discharge because of cardiology wants to reassess its need in the clinic setting.  NSAID discontinued from her home medication, advised to use only Tylenol for pain and fever control.  She needs follow-up of her subclinical hypothyroidism in clinic, as well as transaminitis and a full lipid profile to assess her cardiovascular risk and treatment per guidelines as needed..      1.  Atrial fibrillation with RVR, new diagnosis. [id number] score is 3 (sex, HTN, LVEF 45 - 50%).  Dyspnea and palpitation for 1 week.  NO prior history of similar symptoms. Because of possible long standing (subclinical) AF and \"at least\" one week of palpitations Heparin drip started until seen by Cardiology and plan outlined.  Diltiazem bolus 15  mg IV followed by diltiazem drip.  She also received Lasix 40 mg IV in the ER.     Uneventful hospital state.  Cardiology  consulted.  Echocardiogram  reveals:    Interpretation Summary   Left ventricular systolic function is mildly reduced.  Biplane LVEF is 48%.  There is mild global hypokinesia of the left ventricle.  The right ventricle is mildly dilated.  There is mod-severe to severe (3-4+) tricuspid regurgitation.  The right ventricular systolic function is borderline reduced.  The study was technically adequate. There is no comparison study available     2.  Essential Hypertension, not on medication  Blood pressure elevated at 151/133.  Currently on p.o. diltiazem, much improved.   Need follow-up in the outpatient, adjustment as needed.   3.  History of asthma: No evidence of asthma exacerbation at this time  As needed albuterol  4.  Mild LV dysfunction, LVEF 45-50%, global hypokinesis, normal troponin, likely due to nonischemic cardiomyopathy related to atrial fibrillation  5.  TR, mod-severe  6.  BMI 28. 5  7.  Possible undx QUINCY  8.  Subclinical hypothyroidism.      # Discharge Pain Plan:    - Patient currently has NO PAIN and " is not being prescribed pain medications on discharge.      Significant Results and Procedures   See below     Pending Results      Unresulted Labs Ordered in the Past 30 Days of this Admission     No orders found from 2/6/2022 to 3/9/2022.          Code Status   Full Code       Primary Care Physician   [facility]    GEN:  Alert, oriented x 3, appears comfortable, NAD.  HEENT:  Normocephalic/atraumatic, no scleral icterus, no nasal discharge, mouth moist.  CV:  IRIR, no murmur heard  or JVD seen.     LUNGS:  Clear to auscultation bilaterally without rales/rhonchi/wheezing/retractions.  Symmetric chest rise on inhalation noted.  ABD:  Active bowel sounds, soft, non-tender/non-distended.  No rebound/guarding/rigidity.  EXT:  No edema or cyanosis.  No joint synovitis noted.  SKIN:  Dry to touch, no exanthems noted in the visualized areas.     Discharge Disposition   Discharged to home    Consultations This Hospital Stay   CARDIOLOGY IP CONSULT  PHARMACY IP CONSULT  PHARMACY IP CONSULT  PHARMACY LIAISON FOR MEDICATION COVERAGE CONSULT    Time Spent on this Encounter   I, Juventino Poole MD, personally saw the patient today and spent greater than 30 minutes discharging this patient.     Discharge Orders      Basic metabolic panel     CBC with platelets     Follow-Up with Cardiology TOMÁS      Sleep Study (referral)      Follow-Up with Cardiology      Holter Monitor 48 hour Adult Pediatric    New diagnosis of Atrial fibrillation     Discharge Medications   Current Discharge Medication List      START taking these medications    Details   diltiazem ER (CARDIZEM SR) 90 MG 12 hr capsule Take 1 capsule (90 mg) by mouth 2 times daily  Qty: 60 capsule, Refills: 0    Comments: Future refills by PCP Dr. [facility] [facility] with phone number [phone].  Associated Diagnoses: Atrial fibrillation with RVR (H)      Rivaroxaban ANTICOAGULANT (XARELTO STARTER PACK ANTICOAGULANT) 15 & 20 MG TBPK Follow package  instructions.  Qty: 1 each, Refills: 0    Comments: Future refills by PCP Dr. [place] [facility] with phone number [phone].  Associated Diagnoses: Atrial fibrillation with RVR (H)         STOP taking these medications       ibuprofen (ADVIL/MOTRIN) 200 MG tablet Comments:   Reason for Stopping:             Allergies   No Known Allergies  Data   Most Recent 3 CBC's:Recent Labs   Lab Test 03/09/22  0933 03/09/22 0639 03/08/22 1837   WBC 7.5 8.1 7.7   HGB 11.9 11.2* 12.7   MCV 84 86 87    250 283      Most Recent 3 BMP's:  Recent Labs   Lab Test 03/09/22  0639 03/09/22  0035 03/08/22  2356 03/08/22  1837 03/07/19  0115     --   --  141 136   POTASSIUM 3.5  --  3.1* 3.7 3.7   CHLORIDE 108  --   --  109 104   CO2 27  --   --  25 26   BUN 11  --   --  15 14   CR 0.64  --   --  0.78 0.66   ANIONGAP 5  --   --  7 6   URSZULA 8.6  --   --  8.8 8.7   * 115*  --  113* 127*     Most Recent 2 LFT's:  Recent Labs   Lab Test 03/08/22 1837 03/07/19  0115   AST 59* 23   ALT 67* 47   ALKPHOS 105 101   BILITOTAL 0.7 0.5     Most Recent INR's and Anticoagulation Dosing History:  Anticoagulation Dose History     Recent Dosing and Labs Latest Ref Rng & Units 3/8/2022    INR 0.85 - 1.15 1.10        Most Recent 3 Troponin's:No lab results found.  Most Recent Cholesterol Panel:No lab results found.  Most Recent 6 Bacteria Isolates From Any Culture (See EPIC Reports for Culture Details):No lab results found.  Most Recent TSH, T4 and A1c Labs:  Recent Labs   Lab Test 03/08/22 1837   TSH 7.81*   T4 0.96     Results for orders placed or performed during the hospital encounter of 03/08/22   XR Chest Port 1 View    Narrative    EXAM: XR CHEST PORT 1 VIEW  LOCATION: [facility]  DATE/TIME: 3/8/2022 6:52 PM    INDICATION: Shortness of breath.    COMPARISON: Chest x-ray on 3/3/2009.      Impression    IMPRESSION: Single AP view of the chest was obtained. Enlarged cardiac silhouette. Nodular  opacity projects over the right paraspinal area, indeterminant and can be further evaluated with dedicated chest CT if clinically warranted. No significant pleural   effusion or pneumothorax.   CT Chest Pulmonary Embolism w Contrast    Narrative    EXAM: CT CHEST PULMONARY EMBOLISM W CONTRAST  LOCATION: [facility]  DATE/TIME: 3/8/2022 7:59 PM    INDICATION: Shortness of breath PE suspected, low/intermediate prob, neg D-dimer  COMPARISON: None.  TECHNIQUE: CT chest pulmonary angiogram during arterial phase injection of IV contrast. Multiplanar reformats and MIP reconstructions were performed. Dose reduction techniques were used.   CONTRAST: 54mL Isovue 370    FINDINGS:  ANGIOGRAM CHEST: There is some loss of detail given breathing motion, however no obvious pulmonary emboli are seen. There is some reflux of contrast into the IVC and the hepatic veins which can be associated with increased right heart pressure. Thoracic   aorta is negative for dissection    LUNGS AND PLEURA: Mild to moderate findings of mosaic perfusion is seen in greatest in the right lung which is nonspecific, but most commonly associated changes of air trapping from small airway inflammation.    MEDIASTINUM/AXILLAE: Normal.    CORONARY ARTERY CALCIFICATION: None.    UPPER ABDOMEN: Normal.    MUSCULOSKELETAL: Mild bilateral gynecomastia.      Impression    IMPRESSION:  1.  No obvious PE. Mild to moderate mosaic perfusion. Some loss of detail given breathing motion.   Echocardiogram Complete     Value    Biplane LVEF 48%    Narrative    [id number]  [facility]  [id number]  [id number]^LAM^RIN^[facility]  Echocardiography Laboratory  [address] [place] [address]     Name: BISI GUTIERREZ  MRN: [id number]  : 1965  Study Date: 2022 08:19 AM  Age: 57 yrs  Gender: Female  Patient Location: [facility]  Reason For Study: Atrial Fibrillation  Ordering Physician: RIN FRITZ  SOREN  Referring Physician: [facility] [place]  Performed By: Joslyn Mari RDCS     BSA: 1.5 m2  Height: 56 in  Weight: 127 lb  HR: 84  BP: 121/86 mmHg  ______________________________________________________________________________  Procedure  Complete Portable Echo Adult. Optison (NDC #[id number]) given intravenously.  ______________________________________________________________________________  Interpretation Summary     Left ventricular systolic function is mildly reduced.  Biplane LVEF is 48%.  There is mild global hypokinesia of the left ventricle.  The right ventricle is mildly dilated.  There is mod-severe to severe (3-4+) tricuspid regurgitation.  The right ventricular systolic function is borderline reduced.  The study was technically adequate. There is no comparison study available.  ______________________________________________________________________________  Left Ventricle  The left ventricle is normal in size. There is concentric remodeling present.  Left ventricular systolic function is mildly reduced. Biplane LVEF is 48%.  Diastolic function not assessed due to atrial fibrillation. There is mild  global hypokinesia of the left ventricle.     Right Ventricle  The right ventricle is mildly dilated. The right ventricular systolic function  is borderline reduced.     Atria  The left atrium is moderately dilated. The right atrium is mild to moderately  dilated.     Mitral Valve  There is trace mitral regurgitation.     Tricuspid Valve  There is mod-severe to severe (3-4+) tricuspid regurgitation. The right  ventricular systolic pressure is approximated at 23.4 mmHg plus the right  atrial pressure. IVC diameter >2.1 cm collapsing <50% with sniff suggests a  high RA pressure estimated at 15 mmHg or greater. Right ventricular systolic  pressure is elevated, consistent with mild pulmonary hypertension.     Aortic Valve  There is mild trileaflet aortic sclerosis. There is trace to mild  aortic  regurgitation. No aortic stenosis is present.     Pulmonic Valve  The pulmonic valve is not well seen, but is grossly normal.     Vessels  The aortic root is normal size.     Pericardium  Trivial pericardial effusion.     Rhythm  The rhythm was atrial fibrillation.  ______________________________________________________________________________  MMode/2D Measurements & Calculations  IVSd: 1.3 cm     LVIDd: 3.9 cm  LVIDs: 3.2 cm  LVPWd: 0.99 cm  FS: 17.5 %  LV mass(C)d: 146.1 grams  LV mass(C)dI: 99.8 grams/m2  Ao root diam: 3.3 cm  LA dimension: 4.8 cm  asc Aorta Diam: 3.5 cm  LA/Ao: 1.5  LVOT diam: 1.9 cm  LVOT area: 2.8 cm2  LA Volume (BP): 62.4 ml  LA Volume Index (BP): 42.7 ml/m2  RWT: 0.51     Doppler Measurements & Calculations  MV E max makenzie: 93.6 cm/sec  MV max P.1 mmHg  MV mean P.0 mmHg  MV V2 VTI: 18.7 cm  MVA(VTI): 2.7 cm2  MV dec time: 0.16 sec  AI P1/2t: 582.6 msec  LV V1 max P.7 mmHg  LV V1 max: 107.6 cm/sec  LV V1 VTI: 17.8 cm  SV(LVOT): 50.5 ml  SI(LVOT): 34.5 ml/m2  PA acc time: 0.08 sec  TR max makenzie: 241.5 cm/sec  TR max P.4 mmHg  E/E' av.3  Lateral E/e': 11.3  Medial E/e': 15.3     ______________________________________________________________________________  Report approved by: Mickie Cohen 2022 09:53 AM           Disclaimer: This note consists of symbols derived from keyboarding, dictation and/or voice recognition software. As a result, there may be errors in the script that have gone undetected. Please consider this when interpreting information found in this chart.

## 2022-03-10 NOTE — PLAN OF CARE
"Goal Outcome Evaluation:    Vitals: /64 (BP Location: Left arm)   Pulse 79   Temp 97.8  F (36.6  C) (Oral)   Resp 18   Ht 1.422 m (4' 8\")   Wt 57.7 kg (127 lb 1.6 oz)   SpO2 100%   BMI 28.50 kg/m      Orientation: A&Ox4. Primary language Cambodian. Understands some English. Daughter at bedside.     Isolation: No.     Pain: Denies     Respiratory: LS clear, no cough    Cardiac/Tele: A-Fib CVR    Bowel Sounds: +X4Q    GI/: Continent urine, no bm this shift.     Skin: No concerns    LDA: R PIV infusing Hep at 1000. Hep Xa was 0.43. within goal. Continue same rate and recheck in 6 hrs. Next Hep Xa due at 11:30am today.      Protocols: K    Diet: Low fat, Na<2400 MG    Activity: Indep in room.     Followed By: Cards     Plan: Get Holter monitor and discharge home when able.                     "

## 2022-03-10 NOTE — PLAN OF CARE
Goal Outcome Evaluation:      A&OX4. Up independently. VSS. Tele: Afib CVR. PO Xarelto given at 1030AM Heparin drip stopped at 1130AM.  Holter monitor placed by cardiopulmonary. Discharge medication and Discharge instruction given to patient and her , expressed understanding. Pt. Discharge home with family.

## 2022-03-11 ENCOUNTER — PATIENT OUTREACH (OUTPATIENT)
Dept: CARE COORDINATION | Facility: CLINIC | Age: 57
End: 2022-03-11

## 2022-03-11 DIAGNOSIS — Z71.89 OTHER SPECIFIED COUNSELING: ICD-10-CM

## 2022-03-11 NOTE — PROGRESS NOTES
Clinic Care Coordination Contact  [facility]/Voicemail       Clinical Data: Care Coordinator Outreach  Reason for referral: TCM outreach  Outreach attempted x 1.   left message on patient's voicemail with call back information and requested return call. Included  phone number for patients: [phone].  Plan:  Care Coordinator will try to reach patient again in 1-2 business days.       Cathie Starr  Community Health Worker  [facility]  Ph: [phone]

## 2022-03-13 NOTE — PROGRESS NOTES
Clinic Care Coordination Contact  [facility]/Voicemail       Clinical Data: Care Coordinator Outreach  Outreach attempted x 2.  Left message on patient's voicemail with call back information and requested return call.  Plan: Care Coordinator will do no further outreaches at this time.    Amanda Segura  Community Health Worker  [facility]  [phone]

## 2025-01-05 ENCOUNTER — APPOINTMENT (OUTPATIENT)
Dept: CT IMAGING | Facility: CLINIC | Age: 60
End: 2025-01-05
Attending: EMERGENCY MEDICINE
Payer: COMMERCIAL

## 2025-01-05 ENCOUNTER — HOSPITAL ENCOUNTER (EMERGENCY)
Facility: CLINIC | Age: 60
Discharge: HOME | End: 2025-01-05
Attending: EMERGENCY MEDICINE | Admitting: EMERGENCY MEDICINE
Payer: COMMERCIAL

## 2025-01-05 ENCOUNTER — APPOINTMENT (OUTPATIENT)
Dept: GENERAL RADIOLOGY | Facility: CLINIC | Age: 60
End: 2025-01-05
Attending: EMERGENCY MEDICINE
Payer: COMMERCIAL

## 2025-01-05 ENCOUNTER — APPOINTMENT (OUTPATIENT)
Dept: CT IMAGING | Facility: CLINIC | Age: 60
End: 2025-01-05
Payer: COMMERCIAL

## 2025-01-05 ENCOUNTER — APPOINTMENT (OUTPATIENT)
Dept: MRI IMAGING | Facility: CLINIC | Age: 60
End: 2025-01-05
Payer: COMMERCIAL

## 2025-01-05 VITALS
WEIGHT: 148.15 LBS | DIASTOLIC BLOOD PRESSURE: 85 MMHG | TEMPERATURE: 97.2 F | HEIGHT: 56 IN | SYSTOLIC BLOOD PRESSURE: 130 MMHG | BODY MASS INDEX: 33.33 KG/M2 | OXYGEN SATURATION: 98 % | RESPIRATION RATE: 11 BRPM | HEART RATE: 71 BPM

## 2025-01-05 DIAGNOSIS — N63.21 MASS OF UPPER OUTER QUADRANT OF LEFT BREAST: ICD-10-CM

## 2025-01-05 DIAGNOSIS — R29.898 RIGHT ARM WEAKNESS: ICD-10-CM

## 2025-01-05 DIAGNOSIS — I48.0 PAROXYSMAL A-FIB (H): ICD-10-CM

## 2025-01-05 DIAGNOSIS — E83.42 HYPOMAGNESEMIA: ICD-10-CM

## 2025-01-05 DIAGNOSIS — R42 DIZZINESS: Primary | ICD-10-CM

## 2025-01-05 LAB
ALBUMIN SERPL BCG-MCNC: 4.6 G/DL (ref 3.5–5.2)
ALBUMIN UR-MCNC: NEGATIVE MG/DL
ALP SERPL-CCNC: 99 U/L (ref 40–150)
ALT SERPL W P-5'-P-CCNC: 21 U/L (ref 0–50)
ANION GAP SERPL CALCULATED.3IONS-SCNC: 14 MMOL/L (ref 7–15)
APPEARANCE UR: CLEAR
APTT PPP: 27 SECONDS (ref 22–38)
AST SERPL W P-5'-P-CCNC: 19 U/L (ref 0–45)
BASOPHILS # BLD AUTO: 0 10E3/UL (ref 0–0.2)
BASOPHILS NFR BLD AUTO: 1 %
BILIRUB DIRECT SERPL-MCNC: <0.2 MG/DL (ref 0–0.3)
BILIRUB SERPL-MCNC: 0.4 MG/DL
BILIRUB UR QL STRIP: NEGATIVE
BUN SERPL-MCNC: 13.1 MG/DL (ref 6–20)
CALCIUM SERPL-MCNC: 9.6 MG/DL (ref 8.8–10.4)
CHLORIDE SERPL-SCNC: 104 MMOL/L (ref 98–107)
COLOR UR AUTO: NORMAL
CREAT SERPL-MCNC: 0.64 MG/DL (ref 0.51–0.95)
EGFRCR SERPLBLD CKD-EPI 2021: >90 ML/MIN/1.73M2
EOSINOPHIL # BLD AUTO: 0.2 10E3/UL (ref 0–0.7)
EOSINOPHIL NFR BLD AUTO: 2 %
ERYTHROCYTE [DISTWIDTH] IN BLOOD BY AUTOMATED COUNT: 14.1 % (ref 10–15)
FLUAV RNA SPEC QL NAA+PROBE: NEGATIVE
FLUBV RNA RESP QL NAA+PROBE: NEGATIVE
GLUCOSE SERPL-MCNC: 167 MG/DL (ref 70–99)
GLUCOSE UR STRIP-MCNC: NEGATIVE MG/DL
HCO3 SERPL-SCNC: 21 MMOL/L (ref 22–29)
HCT VFR BLD AUTO: 42.7 % (ref 35–47)
HGB BLD-MCNC: 13.8 G/DL (ref 11.7–15.7)
HGB UR QL STRIP: NEGATIVE
HOLD SPECIMEN: NORMAL
IMM GRANULOCYTES # BLD: 0.2 10E3/UL
IMM GRANULOCYTES NFR BLD: 2 %
INR PPP: 0.95 (ref 0.85–1.15)
KETONES UR STRIP-MCNC: NEGATIVE MG/DL
LEUKOCYTE ESTERASE UR QL STRIP: NEGATIVE
LYMPHOCYTES # BLD AUTO: 2.7 10E3/UL (ref 0.8–5.3)
LYMPHOCYTES NFR BLD AUTO: 34 %
MAGNESIUM SERPL-MCNC: 1.6 MG/DL (ref 1.7–2.3)
MCH RBC QN AUTO: 25.1 PG (ref 26.5–33)
MCHC RBC AUTO-ENTMCNC: 32.3 G/DL (ref 31.5–36.5)
MCV RBC AUTO: 78 FL (ref 78–100)
MONOCYTES # BLD AUTO: 0.4 10E3/UL (ref 0–1.3)
MONOCYTES NFR BLD AUTO: 4 %
NEUTROPHILS # BLD AUTO: 4.5 10E3/UL (ref 1.6–8.3)
NEUTROPHILS NFR BLD AUTO: 57 %
NITRATE UR QL: NEGATIVE
NRBC # BLD AUTO: 0 10E3/UL
NRBC BLD AUTO-RTO: 0 /100
PH UR STRIP: 5.5 [PH] (ref 5–7)
PLATELET # BLD AUTO: 286 10E3/UL (ref 150–450)
POTASSIUM SERPL-SCNC: 3.7 MMOL/L (ref 3.4–5.3)
PROT SERPL-MCNC: 7.8 G/DL (ref 6.4–8.3)
RBC # BLD AUTO: 5.5 10E6/UL (ref 3.8–5.2)
RBC URINE: 0 /HPF
RSV RNA SPEC NAA+PROBE: NEGATIVE
SARS-COV-2 RNA RESP QL NAA+PROBE: NEGATIVE
SODIUM SERPL-SCNC: 139 MMOL/L (ref 135–145)
SP GR UR STRIP: 1.01 (ref 1–1.03)
TROPONIN T SERPL HS-MCNC: <6 NG/L
TROPONIN T SERPL HS-MCNC: <6 NG/L
UROBILINOGEN UR STRIP-MCNC: NORMAL MG/DL
WBC # BLD AUTO: 8 10E3/UL (ref 4–11)
WBC URINE: 0 /HPF

## 2025-01-05 PROCEDURE — 70496 CT ANGIOGRAPHY HEAD: CPT

## 2025-01-05 PROCEDURE — A9585 GADOBUTROL INJECTION: HCPCS

## 2025-01-05 PROCEDURE — 85004 AUTOMATED DIFF WBC COUNT: CPT | Performed by: EMERGENCY MEDICINE

## 2025-01-05 PROCEDURE — 36415 COLL VENOUS BLD VENIPUNCTURE: CPT | Performed by: EMERGENCY MEDICINE

## 2025-01-05 PROCEDURE — 70450 CT HEAD/BRAIN W/O DYE: CPT

## 2025-01-05 PROCEDURE — 93005 ELECTROCARDIOGRAM TRACING: CPT

## 2025-01-05 PROCEDURE — 81003 URINALYSIS AUTO W/O SCOPE: CPT | Performed by: EMERGENCY MEDICINE

## 2025-01-05 PROCEDURE — 250N000009 HC RX 250: Performed by: EMERGENCY MEDICINE

## 2025-01-05 PROCEDURE — 96365 THER/PROPH/DIAG IV INF INIT: CPT | Mod: 59

## 2025-01-05 PROCEDURE — 80076 HEPATIC FUNCTION PANEL: CPT | Performed by: EMERGENCY MEDICINE

## 2025-01-05 PROCEDURE — 70553 MRI BRAIN STEM W/O & W/DYE: CPT

## 2025-01-05 PROCEDURE — 250N000011 HC RX IP 250 OP 636: Performed by: EMERGENCY MEDICINE

## 2025-01-05 PROCEDURE — 250N000013 HC RX MED GY IP 250 OP 250 PS 637: Performed by: EMERGENCY MEDICINE

## 2025-01-05 PROCEDURE — 255N000002 HC RX 255 OP 636

## 2025-01-05 PROCEDURE — G0508 CRIT CARE TELEHEA CONSULT 60: HCPCS | Mod: G0

## 2025-01-05 PROCEDURE — 80048 BASIC METABOLIC PNL TOTAL CA: CPT | Performed by: EMERGENCY MEDICINE

## 2025-01-05 PROCEDURE — 71046 X-RAY EXAM CHEST 2 VIEWS: CPT

## 2025-01-05 PROCEDURE — 96375 TX/PRO/DX INJ NEW DRUG ADDON: CPT | Mod: 59

## 2025-01-05 PROCEDURE — 85048 AUTOMATED LEUKOCYTE COUNT: CPT | Performed by: EMERGENCY MEDICINE

## 2025-01-05 PROCEDURE — 96361 HYDRATE IV INFUSION ADD-ON: CPT

## 2025-01-05 PROCEDURE — 99285 EMERGENCY DEPT VISIT HI MDM: CPT | Mod: 25

## 2025-01-05 PROCEDURE — 258N000003 HC RX IP 258 OP 636: Performed by: EMERGENCY MEDICINE

## 2025-01-05 PROCEDURE — 0042T CT HEAD PERFUSION W CONTRAST: CPT

## 2025-01-05 PROCEDURE — 87637 SARSCOV2&INF A&B&RSV AMP PRB: CPT | Performed by: EMERGENCY MEDICINE

## 2025-01-05 PROCEDURE — 85730 THROMBOPLASTIN TIME PARTIAL: CPT | Performed by: EMERGENCY MEDICINE

## 2025-01-05 PROCEDURE — 85610 PROTHROMBIN TIME: CPT | Performed by: EMERGENCY MEDICINE

## 2025-01-05 PROCEDURE — 83735 ASSAY OF MAGNESIUM: CPT | Performed by: EMERGENCY MEDICINE

## 2025-01-05 PROCEDURE — 84484 ASSAY OF TROPONIN QUANT: CPT | Performed by: EMERGENCY MEDICINE

## 2025-01-05 PROCEDURE — 96366 THER/PROPH/DIAG IV INF ADDON: CPT

## 2025-01-05 RX ORDER — IOPAMIDOL 755 MG/ML
67 INJECTION, SOLUTION INTRAVASCULAR ONCE
Status: COMPLETED | OUTPATIENT
Start: 2025-01-05 | End: 2025-01-05

## 2025-01-05 RX ORDER — MECLIZINE HYDROCHLORIDE 25 MG/1
25 TABLET ORAL ONCE
Status: COMPLETED | OUTPATIENT
Start: 2025-01-05 | End: 2025-01-05

## 2025-01-05 RX ORDER — GADOBUTROL 604.72 MG/ML
6.5 INJECTION INTRAVENOUS ONCE
Status: COMPLETED | OUTPATIENT
Start: 2025-01-05 | End: 2025-01-05

## 2025-01-05 RX ORDER — LORAZEPAM 2 MG/ML
1 INJECTION INTRAMUSCULAR
Status: COMPLETED | OUTPATIENT
Start: 2025-01-05 | End: 2025-01-05

## 2025-01-05 RX ORDER — MAGNESIUM SULFATE HEPTAHYDRATE 40 MG/ML
2 INJECTION, SOLUTION INTRAVENOUS ONCE
Status: COMPLETED | OUTPATIENT
Start: 2025-01-05 | End: 2025-01-05

## 2025-01-05 RX ORDER — ACETAMINOPHEN 325 MG/1
975 TABLET ORAL ONCE
Status: COMPLETED | OUTPATIENT
Start: 2025-01-05 | End: 2025-01-05

## 2025-01-05 RX ORDER — MECLIZINE HYDROCHLORIDE 25 MG/1
25 TABLET ORAL 3 TIMES DAILY PRN
Qty: 21 TABLET | Refills: 0 | Status: SHIPPED | OUTPATIENT
Start: 2025-01-05

## 2025-01-05 RX ADMIN — ACETAMINOPHEN 975 MG: 325 TABLET, FILM COATED ORAL at 16:22

## 2025-01-05 RX ADMIN — MECLIZINE HYDROCHLORIDE 25 MG: 25 TABLET ORAL at 16:24

## 2025-01-05 RX ADMIN — SODIUM CHLORIDE 80 ML: 9 INJECTION, SOLUTION INTRAVENOUS at 12:48

## 2025-01-05 RX ADMIN — IOPAMIDOL 117 ML: 755 INJECTION, SOLUTION INTRAVENOUS at 12:47

## 2025-01-05 RX ADMIN — MAGNESIUM SULFATE HEPTAHYDRATE 2 G: 40 INJECTION, SOLUTION INTRAVENOUS at 16:22

## 2025-01-05 RX ADMIN — SODIUM CHLORIDE, POTASSIUM CHLORIDE, SODIUM LACTATE AND CALCIUM CHLORIDE 1000 ML: 600; 310; 30; 20 INJECTION, SOLUTION INTRAVENOUS at 14:44

## 2025-01-05 RX ADMIN — GADOBUTROL 6.5 ML: 604.72 INJECTION INTRAVENOUS at 15:16

## 2025-01-05 RX ADMIN — LORAZEPAM 1 MG: 2 INJECTION INTRAMUSCULAR; INTRAVENOUS at 15:06

## 2025-01-05 RX ADMIN — RIVAROXABAN 20 MG: 20 TABLET, FILM COATED ORAL at 17:39

## 2025-01-05 ASSESSMENT — ACTIVITIES OF DAILY LIVING (ADL)
ADLS_ACUITY_SCORE: 44

## 2025-01-05 ASSESSMENT — COLUMBIA-SUICIDE SEVERITY RATING SCALE - C-SSRS
1. IN THE PAST MONTH, HAVE YOU WISHED YOU WERE DEAD OR WISHED YOU COULD GO TO SLEEP AND NOT WAKE UP?: NO
2. HAVE YOU ACTUALLY HAD ANY THOUGHTS OF KILLING YOURSELF IN THE PAST MONTH?: NO
6. HAVE YOU EVER DONE ANYTHING, STARTED TO DO ANYTHING, OR PREPARED TO DO ANYTHING TO END YOUR LIFE?: NO

## 2025-01-05 NOTE — DISCHARGE INSTRUCTIONS
Please follow-up with your primary care provider and/or specialist regarding your visit to the ER today.    Please return to the emergency department should you experience any of the symptoms we specifically discussed, including but not limited to recurrence or worsening of your symptoms, or development of any new and concerning symptoms such as headache, dizziness, numbness, weakness, vision changes, confusion, or new bleeding.    Please take medication as instructed on bottle.  You are resumed back on your usual Xarelto.

## 2025-01-05 NOTE — LETTER
Aamir West was seen and treated in our emergency department on 1/5/2025.  She may return to work on 01/07/2025  With out restrictions     If you have any questions or concerns, please don't hesitate to call      Sanjana POE

## 2025-01-05 NOTE — ED TRIAGE NOTES
Pt complains of dizziness and R sided weakness that is worse w/ movement that started around [id number] this AM while going to the bathroom. No hx of vertigo.     Pt notes she stopped taking a blood thinner 1 year ago due to cost.   Jenn leslie called to triage room 1.

## 2025-01-05 NOTE — CONSULTS
[facility]     Stroke Code Note          History of Present Illness     Chief Complaint: Dizziness (/) and One-sided Weakness (/)    Aamir West is a 59 year old right-handed female with a PMH significant for atrial fibrillation (not on anticoagulation, previously was on Xarelto but discontinued due to cost and difficulty obtaining refills) and obstructive sleep apnea who presented today with dizziness and right-sided weakness.  History obtained with the assistance of Samira, Aamir's daughter, who acted as  (unable to obtain [place]ian  x 3 attempts). Aamir went to bed at 2:00 AM this morning but woke up at 4 AM to use the restroom.  At that time, she denied any symptoms.  She then awoke at 10 AM with symptoms of dizziness/lightheadedness.  She reported feeling lightheaded like she was going to pass out.  She denied room spinning like sensation.  She also endorsed weakness of the right arm and right leg.  She denied any associated vision or speech changes, or numbness.  She denies any prior history of stroke or any prior episodes of her presenting symptoms.  Presenting blood pressure was 168/122. Blood Glucose 167. INR: 0.95, PLT: 286K, PT > 15 secs, aPTT: 27    On telestroke examination, Aamir has decreased right facial, arm, leg sensation but otherwise no weakness or ataxia/dysmetria appreciated.  No truncal ataxia appreciated with ambulation. NIH stroke scale of 1.  At the end of examination, Aamir endorses some improvement in her dizziness/lightheadedness.  Discussed TNK with Aamir and Samira and given Aamir is > 4.5 hours from last known well and symptoms are improving/low NIH would not recommend TNK as the risks outweigh the potential benefits.  They verbalized understanding of this recommendation. Discussed recommendation to obtain MRI brain to further evaluate for stroke. Also discussed the importance of eventual anticoagulation for stroke prevention given Aamir's history  of atrial fibrillation.         Past Medical History     Stroke risk factors: Atrial fibrillation not on anticoagulation, obstructive sleep apnea    Preadmission antithrombotic regimen: None    Modified Anil Score (Pre-morbid)  0 - No symptoms.                 Assessment and Plan       Dizziness/lightheadedness, right sided decreased sensation, right-sided weakness unclear etiology.  Will obtain MRI brain to evaluate for acute stroke.      Known atrial fibrillation, not on anticoagulation    Origin of right vertebral artery severe stenosis     Intravenous Thrombolysis  Not given due to:   - minor/isolated/quickly resolving symptoms  - unclear or unfavorable risk-benefit profile for extended window thrombolysis beyond the conventional 4.5 hour time window     Endovascular Treatment  Not initiated due to absence of proximal vessel occlusion     Plan:  - brain MRI with and without contrast with coronal DWI; please page stroke neurology once completed for further recommendations  - Recommend metabolic/infectious/toxic workup per ED team   - Recommend symptomatic management of dizziness/lightheadedness   - Given history of atrial fibrillation, would recommend anticoagulation for stroke prevention.  Recommend social work/care coordination assistance specifically with financials/costs of anticoagulation, discussed with primary team   - Pharmacy Liaison consult to check cost of anti-coagulation (ordered)    ADDENDUM @ 4:47 pm   - MRI brain reviewed and completed.  Fortunately, MRI brain is negative for acute infarct.  - Reiterate recommendation to initiate anticoagulation (Ideally, prefer Eliquis but whichever anticoagulation medication is most cost effective for patient is okay) for stroke prevention, discussed again with primary team.   - Appreciate social work/care coordination assistance with financial assistance (consult ordered)  - BEFAST stroke warning signs attached on discharged instructions  - Continue nightly  "use of CPAP   - No further stroke workup is recommended, we will sign off. Please contact us for additional questions or concerns.     Patient case discussed with Vascular Neurology Attending Dr. Brenna Vaughan PA-C  Vascular Neurology    To page me or covering stroke neurology team member, click here: [facility]  Choose \"On Call\" tab at top, then select \"NEUROLOGY/ALL SITES\" from middle drop-down box, press Enter, then look for \"stroke\" or \"telestroke\" for your site.  ___________________________________________________________________      Imaging/Labs   (personally reviewed CT head, CTA head and neck, CT perfusion)    CT head: No evidence of hemorrhage  CTA head/neck: No LVO.  Short segment high-grade stenosis greater than 95% of the origin of the right vertebral artery.  Short segment stenosis 15 to 20% of the origin of the right ICA.  Masslike lesion within the superior portion of the left breast measuring 1 cm x 1.6 cm in transaxial dimension. This mass appear to be present on previous CT scan of 3/8/2022 but may have slightly increased in size. This may simply represent   fibroglandular tissue. However an enlarging breast mass is a consideration for this finding and follow-up imaging with mammogram/ultrasound is suggested  CT Perfusion: No perfusion deficit         Physical Examination     BP: (!) 168/122   Pulse: 72   Resp: 17   Temp: 97.2  F (36.2  C)   Temp src: Temporal   SpO2: 98 %   O2 Device: None (Room air)   Weight: 67.2 kg (148 lb 2.4 oz)    Wt Readings from Last 2 Encounters:   01/05/25 67.2 kg (148 lb 2.4 oz)   03/08/22 57.7 kg (127 lb 1.6 oz)       General Exam  General:  patient lying in bed without any acute distress    HEENT:  normocephalic/atraumatic  Pulmonary:  no respiratory distress    Neuro Exam  Mental Status:  alert, oriented to age and month, follows commands, speech clear though difficult to fully assess due language difference (able to repeat few words in English \"mama, " "data, \" that were clear), no slurred speech or word finding difficulty per daughter, naming and repetition normal  Cranial Nerves:  visual fields intact (tested by nurse), EOMI with normal smooth pursuit, decreased right facial sensation (tested by nurse), facial movements symmetric, hearing not formally tested but intact to conversation, no dysarthria, shoulder shrug equal bilaterally, tongue protrusion midline  Motor:  no abnormal movements, able to move all limbs antigravity spontaneously with no signs of hemiparesis observed, no pronator drift, no leg drift  Reflexes:  unable to test (telestroke)  Sensory: Decreased right upper and lower extremity light touch sensation, light touch sensation intact on left upper and lower extremity (assessed by nurse), no extinction on double simultaneous stimulation (assessed by nurse)  Coordination:  normal finger-to-nose and heel-to-shin bilaterally without dysmetria, rapid alternating movements symmetric, no orbiting with arm rolling, bilateral finger taps symmetric  Station/Gait: No truncal ataxia appreciated with independent ambulation from a wheelchair to the exam table and around the room      Stroke Scales     NIHSS  1a. Level of Consciousness 0-->Alert, keenly responsive   1b. LOC Questions 0-->Answers both questions correctly   1c. LOC Commands 0-->Performs both tasks correctly   2.   Best Gaze 0-->Normal   3.   Visual 0-->No visual loss   4.   Facial Palsy 0-->Normal symmetrical movements   5a. Motor Arm, Left 0-->No drift, limb holds 90 (or 45) degrees for full 10 secs   5b. Motor Arm, Right 0-->No drift, limb holds 90 (or 45) degrees for full 10 secs   6a. Motor Leg, Left 0-->No drift, leg holds 30 degree position for full 5 secs   6b. Motor Leg, right 0-->No drift, leg holds 30 degree position for full 5 secs   7.   Limb Ataxia 0-->Absent   8.   Sensory 1-->Mild-to-moderate sensory loss, patient feels pinprick is less sharp or is dull on the " affected side, or there is a loss of superficial pain with pinprick, but patient is aware of being touched   9.   Best Language 0-->No aphasia, normal   10. Dysarthria 0-->Normal   11. Extinction and Inattention  0-->No abnormality   Total 1 (01/05/25 1315)       Labs     CBC  Lab Results   Component Value Date    HGB 13.8 01/05/2025    HCT 42.7 01/05/2025    WBC 8.0 01/05/2025     01/05/2025       BMP  Lab Results   Component Value Date     01/05/2025    POTASSIUM 3.7 01/05/2025    CHLORIDE 104 01/05/2025    CO2 21 (L) 01/05/2025    BUN 13.1 01/05/2025    CR 0.64 01/05/2025     (H) 01/05/2025    URSZULA 9.6 01/05/2025       INR  INR   Date Value Ref Range Status   01/05/2025 0.95 0.85 - 1.15 Final   03/08/2022 1.10 0.85 - 1.15 Final       Data   Stroke Code Data  (for stroke code with tele)  Stroke code activated 01/05/25  1240   First stroke provider response 01/05/25  1241   Video start time 01/05/25  1248   Video end time 01/05/25  1333   Last known normal 01/05/25  0400   Time of discovery (or onset of symptoms)  01/05/25  1000   Head CT read by Stroke Neuro Provider 01/05/25  1255   Was stroke code de-escalated? Yes  01/05/25  1335     Telestroke Service Details  Type of service telemedicine diagnostic assessment of acute neurological changes   Reason telemedicine is appropriate patient requires assessment with a specialist for diagnosis and treatment of neurological symptoms   Mode of transmission secure interactive audio and video communication per Ike   Originating site (patient location) [facility]    Distant site (provider location) [facility]        Clinically Significant Risk Factors Present on Admission                # Drug Induced Coagulation Defect: home medication list includes an anticoagulant medication              # Obesity: Estimated body mass index is 33.21 kg/m  as calculated from the following:    Height as of  "this encounter: 1.422 m (4' 8\").    Weight as of this encounter: 67.2 kg (148 lb 2.4 oz).              Time Spent on this Encounter   Billing: I personally examined and evaluated the patient today. At the time of my evaluation and management the patient was critical condition today due to strokelike symptoms. I personally managed stroke code. Key decisions made today included examination, review of imaging, need for further imaging, history obtaining, discussion with ED provider. I spent a total of 75 minutes providing critical care services, evaluating the patient, directing care and reviewing laboratory values and radiologic reports.   "

## 2025-01-05 NOTE — ED PROVIDER NOTES
Emergency Department Note      History of Present Illness     Chief Complaint   Dizziness (/) and One-sided Weakness (/)      MOIRA West is a 59 year old female with history of atrial fibrillation who presents to the ED with her daughter for dizziness and right upper extremity numbness and weakness. The patient's daughter reports that the patient developed room spinning dizziness around [id number].  Went to bed around 2 AM last night.  At the same time, the patient developed right sided shoulder pain and heaviness. Her dizziness worsens when she moves around  Improves when sitting still. The patient also finds it difficult with balance when she walks too fast. They come into the ED because of concern for stroke. Denies fever, rhinorrhea, congestion, cough, chest pain, shortness of breath, nausea, vomiting, abdominal pain, urinary symptoms, diarrhea, or any recent sick contacts. The patient did have a headache earlier today but it has since resolved.  Patient was previously prescribed Xarelto for atrial fibrillation, subsequently switched to Eliquis, but has been off of anticoagulation for the past year for an unknown reason, but potentially medication cost related.    Independent Historian   Daughter as detailed above.    Review of External Notes   3/10/2022 discharge summary    Past Medical History     Medical History and Problem List   No past medical history on file.    Medications   diltiazem ER (CARDIZEM SR) 90 MG 12 hr capsule  Rivaroxaban ANTICOAGULANT (XARELTO STARTER PACK ANTICOAGULANT) 15 & 20 MG TBPK    Surgical History   No past surgical history on file.    Physical Exam     Patient Vitals for the past 24 hrs:   BP Temp Temp src Pulse Resp SpO2 Height Weight   01/05/25 1730 130/85 -- -- 71 -- -- -- --   01/05/25 1711 136/83 -- -- -- -- 98 % -- --   01/05/25 1656 (!) 142/85 -- -- -- -- 96 % -- --   01/05/25 1641 (!) 142/79 -- -- -- -- 97 % -- --   01/05/25 1626 (!) 143/101 -- -- -- -- 99 % -- --  "  01/05/25 1615 (!) 154/87 -- -- 76 -- 99 % -- --   01/05/25 1611 (!) 161/91 -- -- 78 -- 97 % -- --   01/05/25 1500 (!) 144/92 -- -- 72 11 100 % -- --   01/05/25 1229 (!) 168/122 97.2  F (36.2  C) Temporal 72 17 98 % -- --   01/05/25 1228 -- -- -- -- -- -- 1.422 m (4' 8\") 67.2 kg (148 lb 2.4 oz)     Physical Exam  Constitutional:       General: Not in acute distress.     Appearance: Normal appearance  HENT:      Head: Normocephalic and atraumatic.   Eyes:     Extraocular Movements: Extraocular movements intact.      Conjunctiva/sclera: Conjunctivae normal.      Pupils: Pupils are equal, round, and reactive to light.   Cardiovascular:     Rate and Rhythm: Normal rate and regular rhythm.   Pulmonary:      Effort: Pulmonary effort is normal.      Breath sounds: Normal breath sounds.   Abdominal:      General: Abdomen is flat. There is no distension.      Palpations: Abdomen is soft.      Tenderness: There is no abdominal tenderness.   Musculoskeletal:      Cervical back: Normal range of motion and neck supple. No rigidity.      General: No swelling or deformity.   Skin:     General: Skin is warm and dry.   Neurological:         NIHSS: Patient alert and keenly responsive. Able to answer month and age. Able and blink eyes and squeeze hands. No gaze palsy. No visual field deficits. No facial palsy. No arm drift bilaterally. No leg drift bilaterally.  Mild dysmetria/ataxia to the right finger-nose-finger. Able to complete heel to shin bilaterally.  Mild diminished sensation to right upper extremity. No language deficits/aphasia. No dysarthria. No extinction/inattention.     NIHSS score of 2.       Mental Status: Alert and oriented to person, place, and time.   Psychiatric:         Mood and Affect: Mood normal.         Behavior: Behavior normal.     Diagnostics     Lab Results   Labs Ordered and Resulted from Time of ED Arrival to Time of ED Departure   BASIC METABOLIC PANEL - Abnormal       Result Value    Sodium 139      " Potassium 3.7      Chloride 104      Carbon Dioxide (CO2) 21 (*)     Anion Gap 14      Urea Nitrogen 13.1      Creatinine 0.64      GFR Estimate >90      Calcium 9.6      Glucose 167 (*)    CBC WITH PLATELETS AND DIFFERENTIAL - Abnormal    WBC Count 8.0      RBC Count 5.50 (*)     Hemoglobin 13.8      Hematocrit 42.7      MCV 78      MCH 25.1 (*)     MCHC 32.3      RDW 14.1      Platelet Count 286      % Neutrophils 57      % Lymphocytes 34      % Monocytes 4      % Eosinophils 2      % Basophils 1      % Immature Granulocytes 2      NRBCs per 100 WBC 0      Absolute Neutrophils 4.5      Absolute Lymphocytes 2.7      Absolute Monocytes 0.4      Absolute Eosinophils 0.2      Absolute Basophils 0.0      Absolute Immature Granulocytes 0.2      Absolute NRBCs 0.0     MAGNESIUM - Abnormal    Magnesium 1.6 (*)    INR - Normal    INR 0.95     PARTIAL THROMBOPLASTIN TIME - Normal    aPTT 27     TROPONIN T, HIGH SENSITIVITY - Normal    Troponin T, High Sensitivity <6     INFLUENZA A/B, RSV AND SARS-COV2 PCR - Normal    Influenza A PCR Negative      Influenza B PCR Negative      RSV PCR Negative      SARS CoV2 PCR Negative     ROUTINE UA WITH MICROSCOPIC REFLEX TO CULTURE - Normal    Color Urine Straw      Appearance Urine Clear      Glucose Urine Negative      Bilirubin Urine Negative      Ketones Urine Negative      Specific Gravity Urine 1.010      Blood Urine Negative      pH Urine 5.5      Protein Albumin Urine Negative      Urobilinogen Urine Normal      Nitrite Urine Negative      Leukocyte Esterase Urine Negative      RBC Urine 0      WBC Urine 0     HEPATIC FUNCTION PANEL - Normal    Protein Total 7.8      Albumin 4.6      Bilirubin Total 0.4      Alkaline Phosphatase 99      AST 19      ALT 21      Bilirubin Direct <0.20     TROPONIN T, HIGH SENSITIVITY - Normal    Troponin T, High Sensitivity <6     GLUCOSE MONITOR NURSING POCT       Imaging   XR Chest 2 Views   Final Result   IMPRESSION: Negative chest.      MR  Brain w/o & w Contrast   Final Result   IMPRESSION:   1.  No acute or subacute ischemic change.   2.  No acute intracranial process or abnormal enhancement.      CT Head Perfusion w Contrast   Final Result   IMPRESSION:       CT PERFUSION:   1.  Normal cerebral perfusion.      CTA Head Neck with Contrast   Final Result   IMPRESSION:       HEAD CTA:    1.  Normal CTA [place] [facility] [place].      NECK CTA:   1.  Short segment high-grade stenosis greater than 95% at the origin of the right vertebral artery.      2.  Short segment stenosis of 15-20% at the origin of the right ICA.      3.  Masslike lesion within the superior portion of the left breast measuring 1 cm x 1.6 cm in transaxial dimension. This mass appear to be present on previous CT scan of 3/8/2022 but may have slightly increased in size. This may simply represent    fibroglandular tissue. However an enlarging breast mass is a consideration for this finding and follow-up imaging with mammogram/ultrasound is suggested.      Results were discussed with Dr. Sevilla at 1:28 PM      CT Head w/o Contrast   Final Result   IMPRESSION:   1.  Normal head CT.      Results were discussed with Dr. Sevilla at  1:05 PM          EKG   ECG results from 01/05/25   EKG 12-lead, tracing only     Value    Systolic Blood Pressure     Diastolic Blood Pressure     Ventricular Rate 73    Atrial Rate 73    WY Interval 124    QRS Duration 92        QTc 451    P Axis 41    R AXIS -21    T Axis 227    Interpretation ECG      Sinus rhythm  Left ventricular hypertrophy with repolarization abnormality ( R in aVL )  Abnormal ECG  When compared with ECG of 08-Mar-2022 18:40,  Sinus rhythm has replaced Atrial fibrillation  Vent. rate has decreased by  78 bpm  Questionable change in QRS axis  Nonspecific T wave abnormality no longer evident in Anterior leads       See ED course for independent interpretation of EKG.    Independent Interpretation   See ED course    ED Course      Medications  Administered   Medications   iopamidol (ISOVUE-370) solution 67 mL (117 mLs Intravenous $Given 1/5/25 1247)     And   sodium chloride 0.9 % bag for CT scan flush use (80 mLs As instructed $Given 1/5/25 1248)   LORazepam (ATIVAN) injection 1 mg (1 mg Intravenous $Given 1/5/25 1506)   lactated ringers BOLUS 1,000 mL (0 mLs Intravenous Stopped 1/5/25 1806)   gadobutrol (GADAVIST) injection 6.5 mL (6.5 mLs Intravenous $Given 1/5/25 1516)   meclizine (ANTIVERT) tablet 25 mg (25 mg Oral $Given 1/5/25 1624)   acetaminophen (TYLENOL) tablet 975 mg (975 mg Oral $Given 1/5/25 1622)   magnesium sulfate 2 g in 50 mL sterile water intermittent infusion (0 g Intravenous Stopped 1/5/25 1809)   rivaroxaban ANTICOAGULANT (XARELTO) tablet 20 mg (20 mg Oral $Given 1/5/25 1739)       Procedures   Procedures     Discussion of Management   See ED course    ED Course   ED Course as of 01/05/25 1951   Sun Jan 05, 2025   1239 Tier I stroke    1240 I obtained history and performed physical exam as noted above.    1245 I spoke with NESTOR Cline from stroke neurology.   1308 95% stenosis to the V1 distribution of the right vertebral artery per reading radiologist.   1316 CT Head w/o Contrast  No acute findings   1327 Notified by reading radiologist regarding incidental finding of left upper quadrant breast mass that appears slightly enlarged as compared with previous CT PE chest.   1338 I spoke with NESTOR Cline from stroke neurology again. She wants to deescalate and take an MRI.     1616 MR Brain w/o & w Contrast  No acute findings   1616 XR Chest 2 Views  On my independent interpretation of chest x-ray, there is subtle left lower lobe opacity.  No mediastinal widening.  No pneumothorax.   1625 Ambulated to restroom, slightly dizzy, but fine   1651 Discussed patient with stroke neurology who cleared patient from a stroke standpoint.  No further stroke workup needed.  Patient however will require anticoagulation.   1753 Troponin T, High  Sensitivity: <6  2 set flat   1759 Patient updated       Additional Documentation  None    Medical Decision Making / Diagnosis     CMS Diagnoses: None    MIPS       None    MDM   Aamir West is a 59 year old female as described above presents to the emergency department for room spinning dizziness and right-sided weakness.  Patient reports that symptom onset is roughly around 9 to 4 this morning.  Patient has a history of atrial fibrillation and is no longer on anticoagulation due to medication cost.  Patient hypertensive at time of evaluation.  Blood pressure 168/122.  On examination, patient has right finger-nose-finger dysmetria/ataxia in addition to subjective right arm weakness and continued dizziness.  Findings certainly concerning for posterior circulation CVA.  Last known well time roughly 3 to 3-1/2 hours prior to arrival.  Tier 1 stroke alert activated.  Additional differential diagnosis considered includes, but not limited to, CVA, TIA, neck vessel dissection, vestibular neuritis, labyrinthitis, BPPV, acute viral syndrome, or atypical presentation of ACS.  Cardiac and infectious workup ordered in conjunction with tier 1 stroke orders.  Discussed care plan with patient and family voiced understanding and agreement plan.  Answered all questions.  Additional workup and orders as listed in chart.     Ultimately, work up shows no acute findings on CT head, CTA head and neck, or MRI.  Stroke neurology reviewed imaging findings and cleared patient from stroke standpoint.  No further stroke workup recommended per stroke team.  However, they emphasized the importance that patient restart her anticoagulation.  After speaking with the patient, they are no longer concerned about cost for the medication and are interested in reinitiation of medication.  They would prefer to be restarted on Xarelto instead of Eliquis.  Patient was given single dose of Xarelto in the ER and discharged with renewed prescription.  Mild  hypomagnesemia on lab work today for which patient was repleted.  Remainder of dizziness workup unremarkable.  Negative cardiac enzyme for ischemic cardiac disease process and EKG otherwise unremarkable.  Meclizine, IV fluids, and Tylenol help resolve patient's dizziness in addition to right-sided shoulder ache.  Ultimately, patient feels comfortable with discharge home to outpatient follow-up.  Certainly possible symptoms today secondary to BPPV/peripheral etiology.  Patient was given dizzy clinic information and outpatient referral as well.  In regards to incidental finding of left upper quadrant breast mass, patient was given breast center clinic referral.  Advised importance of follow-up due to concern for potential developing breast malignancy.  Discussed strict return precautions.  Answered all questions.  Patient voiced understanding and agreement with plan and comfortable discharge home.     Please refer to ED course above as part of continuation of MDM for details on the patient's treatment course and any potential changes or updates beyond my initial evaluation and MDM creation.    Disposition   The patient was discharged.     Diagnosis     ICD-10-CM    1. Dizziness  R42       2. Right arm weakness  R29.898       3. Mass of upper outer quadrant of left breast  N63.21 Breast Provider  Referral      4. Paroxysmal A-fib (H)  I48.0       5. Hypomagnesemia  E83.42            Discharge Medications   Discharge Medication List as of 1/5/2025  6:09 PM        START taking these medications    Details   meclizine (ANTIVERT) 25 MG tablet Take 1 tablet (25 mg) by mouth 3 times daily as needed for dizziness., Disp-21 tablet, R-0, E-Prescribe      rivaroxaban ANTICOAGULANT (XARELTO) 20 MG TABS tablet Take 1 tablet (20 mg) by mouth daily (with dinner)., Disp-30 tablet, R-0, E-Prescribe               Scribe Disclosure:  Jonh SHEA, am serving as a scribe at 2:04 PM on 1/5/2025 to document services personally  performed by Stephan Sevilla DO based on my observations and the provider's statements to me.        Stephan Sevilla DO  01/05/25 1959

## 2025-01-06 ENCOUNTER — PATIENT OUTREACH (OUTPATIENT)
Dept: ONCOLOGY | Facility: CLINIC | Age: 60
End: 2025-01-06
Payer: COMMERCIAL

## 2025-01-06 LAB
ATRIAL RATE - MUSE: 73 BPM
DIASTOLIC BLOOD PRESSURE - MUSE: NORMAL MMHG
INTERPRETATION ECG - MUSE: NORMAL
P AXIS - MUSE: 41 DEGREES
PR INTERVAL - MUSE: 124 MS
QRS DURATION - MUSE: 92 MS
QT - MUSE: 410 MS
QTC - MUSE: 451 MS
R AXIS - MUSE: -21 DEGREES
SYSTOLIC BLOOD PRESSURE - MUSE: NORMAL MMHG
T AXIS - MUSE: 227 DEGREES
VENTRICULAR RATE- MUSE: 73 BPM

## 2025-01-06 NOTE — PROGRESS NOTES
New Patient Oncology Nurse Navigator Note     Referring provider: Stephan Sevilla DO     Referring Clinic/Organization: [facility] Emergency Department     Referred to (specialty:) Breast Provider Referral      Date Referral Received: January 5, 2025     Evaluation for:  N63.21 (ICD-10-CM) - Mass of upper outer quadrant of left breast     Clinical History (per Nurse review of records provided):      Aamir West is a 59 year old female who presented to emergency department for dizziness and one-sided weakness.  A mass-like lesion was identified within the superior portion of the left breast measuring 1 cm x 1.6 cm in transaxial dimension on provider assessment. This mass appear to be present on previous CT scan of 3/8/2022 but may have slightly increased in size. This may simply represent fibroglandular tissue. However an enlarging breast mass is a consideration for this finding and follow-up imaging with mammogram/ultrasound is suggested.    1/5/24 - CTA Head Neck   IMPRESSION:   HEAD CTA:   1.  Normal CTA [place] [facility] [place].  NECK CTA:  1.  Short segment high-grade stenosis greater than 95% at the origin of the right vertebral artery.  2.  Short segment stenosis of 15-20% at the origin of the right ICA.  3.  Masslike lesion within the superior portion of the left breast measuring 1 cm x 1.6 cm in transaxial dimension. This mass appear to be present on previous CT scan of 3/8/2022 but may have slightly increased in size. This may simply represent fibroglandular tissue. However an enlarging breast mass is a consideration for this finding and follow-up imaging with mammogram/ultrasound is suggested.    No current evidence of previous breast imaging on [place] or [facility].  HAS SHE HAD PRIOR MAMMOGRAMS OR OTHER BREAST IMAGING?     She's a [facility] patient in [place]. Will she continue with them or does she want to come to [facility] for [facility]? No evidence of return to [facility]  at writing on 1/6 1145.    CAMBODIAN     CAN ANYONE PUT IN BREAST IMAGING ORDERS?  SHOULD WE HAVE HER COME ALL THE WAY TO [facility]?    Patient resides in [place], [place].    Records Location: Care Everywhere, Media, and See Bookmarked material     Records Needed: Breast imaging for past 5 years (if any was performed)